# Patient Record
Sex: MALE | Race: WHITE | NOT HISPANIC OR LATINO | Employment: FULL TIME | URBAN - METROPOLITAN AREA
[De-identification: names, ages, dates, MRNs, and addresses within clinical notes are randomized per-mention and may not be internally consistent; named-entity substitution may affect disease eponyms.]

---

## 2021-08-06 ENCOUNTER — IMMUNIZATION (OUTPATIENT)
Dept: VACCINE CLINIC | Facility: HOSPITAL | Age: 56
End: 2021-08-06

## 2021-08-06 PROCEDURE — 0001A: CPT | Performed by: INTERNAL MEDICINE

## 2021-08-06 PROCEDURE — 91300 HC SARSCOV02 VAC 30MCG/0.3ML IM: CPT | Performed by: INTERNAL MEDICINE

## 2021-08-16 ENCOUNTER — APPOINTMENT (OUTPATIENT)
Dept: GENERAL RADIOLOGY | Facility: HOSPITAL | Age: 56
End: 2021-08-16

## 2021-08-16 ENCOUNTER — HOSPITAL ENCOUNTER (INPATIENT)
Facility: HOSPITAL | Age: 56
LOS: 3 days | Discharge: HOME | End: 2021-08-19
Attending: EMERGENCY MEDICINE | Admitting: HOSPITALIST

## 2021-08-16 ENCOUNTER — APPOINTMENT (OUTPATIENT)
Dept: CT IMAGING | Facility: HOSPITAL | Age: 56
End: 2021-08-16

## 2021-08-16 DIAGNOSIS — E87.1 HYPONATREMIA: ICD-10-CM

## 2021-08-16 DIAGNOSIS — U07.1 PNEUMONIA DUE TO COVID-19 VIRUS: Primary | ICD-10-CM

## 2021-08-16 DIAGNOSIS — J12.82 PNEUMONIA DUE TO COVID-19 VIRUS: Primary | ICD-10-CM

## 2021-08-16 LAB
ALBUMIN SERPL-MCNC: 3.8 G/DL (ref 3.5–5.2)
ALBUMIN/GLOB SERPL: 2 G/DL
ALP SERPL-CCNC: 99 U/L (ref 39–117)
ALT SERPL W P-5'-P-CCNC: 143 U/L (ref 1–41)
ANION GAP SERPL CALCULATED.3IONS-SCNC: 10.2 MMOL/L (ref 5–15)
AST SERPL-CCNC: 203 U/L (ref 1–40)
B PARAPERT DNA SPEC QL NAA+PROBE: NOT DETECTED
B PERT DNA SPEC QL NAA+PROBE: NOT DETECTED
BACTERIA UR QL AUTO: ABNORMAL /HPF
BILIRUB SERPL-MCNC: 0.5 MG/DL (ref 0–1.2)
BILIRUB UR QL STRIP: NEGATIVE
BUN SERPL-MCNC: 7 MG/DL (ref 6–20)
BUN/CREAT SERPL: 8 (ref 7–25)
C PNEUM DNA NPH QL NAA+NON-PROBE: NOT DETECTED
CALCIUM SPEC-SCNC: 8.1 MG/DL (ref 8.6–10.5)
CHLORIDE SERPL-SCNC: 92 MMOL/L (ref 98–107)
CLARITY UR: CLEAR
CO2 SERPL-SCNC: 22.8 MMOL/L (ref 22–29)
COLOR UR: ABNORMAL
CREAT SERPL-MCNC: 0.87 MG/DL (ref 0.76–1.27)
CRP SERPL-MCNC: 7.08 MG/DL (ref 0–0.5)
D DIMER PPP FEU-MCNC: 0.64 MCGFEU/ML (ref 0–0.49)
D-LACTATE SERPL-SCNC: 1 MMOL/L (ref 0.5–2)
DEPRECATED RDW RBC AUTO: 39.7 FL (ref 37–54)
ERYTHROCYTE [DISTWIDTH] IN BLOOD BY AUTOMATED COUNT: 12.6 % (ref 12.3–15.4)
FLUAV SUBTYP SPEC NAA+PROBE: NOT DETECTED
FLUBV RNA ISLT QL NAA+PROBE: NOT DETECTED
GFR SERPL CREATININE-BSD FRML MDRD: 90 ML/MIN/1.73
GLOBULIN UR ELPH-MCNC: 1.9 GM/DL
GLUCOSE SERPL-MCNC: 97 MG/DL (ref 65–99)
GLUCOSE UR STRIP-MCNC: NEGATIVE MG/DL
HADV DNA SPEC NAA+PROBE: NOT DETECTED
HAV IGM SERPL QL IA: NORMAL
HBV CORE IGM SERPL QL IA: NORMAL
HBV SURFACE AG SERPL QL IA: NORMAL
HCOV 229E RNA SPEC QL NAA+PROBE: NOT DETECTED
HCOV HKU1 RNA SPEC QL NAA+PROBE: NOT DETECTED
HCOV NL63 RNA SPEC QL NAA+PROBE: NOT DETECTED
HCOV OC43 RNA SPEC QL NAA+PROBE: NOT DETECTED
HCT VFR BLD AUTO: 39.3 % (ref 37.5–51)
HCV AB SER DONR QL: NORMAL
HGB BLD-MCNC: 13.7 G/DL (ref 13–17.7)
HGB UR QL STRIP.AUTO: NEGATIVE
HMPV RNA NPH QL NAA+NON-PROBE: NOT DETECTED
HPIV1 RNA SPEC QL NAA+PROBE: NOT DETECTED
HPIV2 RNA SPEC QL NAA+PROBE: NOT DETECTED
HPIV3 RNA NPH QL NAA+PROBE: NOT DETECTED
HPIV4 P GENE NPH QL NAA+PROBE: NOT DETECTED
HYALINE CASTS UR QL AUTO: ABNORMAL /LPF
INR PPP: 0.94 (ref 0.9–1.1)
KETONES UR QL STRIP: ABNORMAL
L PNEUMO1 AG UR QL IA: NEGATIVE
LEUKOCYTE ESTERASE UR QL STRIP.AUTO: NEGATIVE
LYMPHOCYTES # BLD MANUAL: 0.44 10*3/MM3 (ref 0.7–3.1)
LYMPHOCYTES NFR BLD MANUAL: 10.1 % (ref 19.6–45.3)
LYMPHOCYTES NFR BLD MANUAL: 7.1 % (ref 5–12)
M PNEUMO IGG SER IA-ACNC: NOT DETECTED
MAGNESIUM SERPL-MCNC: 2.1 MG/DL (ref 1.6–2.6)
MCH RBC QN AUTO: 30.2 PG (ref 26.6–33)
MCHC RBC AUTO-ENTMCNC: 34.9 G/DL (ref 31.5–35.7)
MCV RBC AUTO: 86.6 FL (ref 79–97)
MONOCYTES # BLD AUTO: 0.31 10*3/MM3 (ref 0.1–0.9)
NEUTROPHILS # BLD AUTO: 3.63 10*3/MM3 (ref 1.7–7)
NEUTROPHILS NFR BLD MANUAL: 82.8 % (ref 42.7–76)
NITRITE UR QL STRIP: NEGATIVE
NT-PROBNP SERPL-MCNC: 210.2 PG/ML (ref 0–900)
PH UR STRIP.AUTO: 6.5 [PH] (ref 5–8)
PLAT MORPH BLD: NORMAL
PLATELET # BLD AUTO: 178 10*3/MM3 (ref 140–450)
PMV BLD AUTO: 10.5 FL (ref 6–12)
POTASSIUM SERPL-SCNC: 3.6 MMOL/L (ref 3.5–5.2)
PROCALCITONIN SERPL-MCNC: 0.42 NG/ML (ref 0–0.25)
PROT SERPL-MCNC: 5.7 G/DL (ref 6–8.5)
PROT UR QL STRIP: ABNORMAL
PROTHROMBIN TIME: 12.4 SECONDS (ref 11.7–14.2)
RBC # BLD AUTO: 4.54 10*6/MM3 (ref 4.14–5.8)
RBC # UR: ABNORMAL /HPF
RBC MORPH BLD: NORMAL
REF LAB TEST METHOD: ABNORMAL
RHINOVIRUS RNA SPEC NAA+PROBE: NOT DETECTED
RSV RNA NPH QL NAA+NON-PROBE: NOT DETECTED
S PNEUM AG SPEC QL LA: NEGATIVE
SARS-COV-2 RNA NPH QL NAA+NON-PROBE: DETECTED
SMUDGE CELLS BLD QL SMEAR: ABNORMAL
SODIUM SERPL-SCNC: 125 MMOL/L (ref 136–145)
SP GR UR STRIP: 1.02 (ref 1–1.03)
SQUAMOUS #/AREA URNS HPF: ABNORMAL /HPF
TROPONIN T SERPL-MCNC: <0.01 NG/ML (ref 0–0.03)
UROBILINOGEN UR QL STRIP: ABNORMAL
WBC # BLD AUTO: 4.38 10*3/MM3 (ref 3.4–10.8)
WBC UR QL AUTO: ABNORMAL /HPF

## 2021-08-16 PROCEDURE — 36415 COLL VENOUS BLD VENIPUNCTURE: CPT

## 2021-08-16 PROCEDURE — 25010000003 CEFTRIAXONE PER 250 MG: Performed by: EMERGENCY MEDICINE

## 2021-08-16 PROCEDURE — 80074 ACUTE HEPATITIS PANEL: CPT | Performed by: HOSPITALIST

## 2021-08-16 PROCEDURE — 85610 PROTHROMBIN TIME: CPT | Performed by: EMERGENCY MEDICINE

## 2021-08-16 PROCEDURE — 80053 COMPREHEN METABOLIC PANEL: CPT | Performed by: EMERGENCY MEDICINE

## 2021-08-16 PROCEDURE — 99284 EMERGENCY DEPT VISIT MOD MDM: CPT

## 2021-08-16 PROCEDURE — 83605 ASSAY OF LACTIC ACID: CPT | Performed by: EMERGENCY MEDICINE

## 2021-08-16 PROCEDURE — 86140 C-REACTIVE PROTEIN: CPT | Performed by: EMERGENCY MEDICINE

## 2021-08-16 PROCEDURE — 85025 COMPLETE CBC W/AUTO DIFF WBC: CPT | Performed by: EMERGENCY MEDICINE

## 2021-08-16 PROCEDURE — 83880 ASSAY OF NATRIURETIC PEPTIDE: CPT | Performed by: EMERGENCY MEDICINE

## 2021-08-16 PROCEDURE — 71045 X-RAY EXAM CHEST 1 VIEW: CPT

## 2021-08-16 PROCEDURE — 71275 CT ANGIOGRAPHY CHEST: CPT

## 2021-08-16 PROCEDURE — 83735 ASSAY OF MAGNESIUM: CPT | Performed by: EMERGENCY MEDICINE

## 2021-08-16 PROCEDURE — 87899 AGENT NOS ASSAY W/OPTIC: CPT | Performed by: INTERNAL MEDICINE

## 2021-08-16 PROCEDURE — 25010000002 AZITHROMYCIN PER 500 MG: Performed by: HOSPITALIST

## 2021-08-16 PROCEDURE — 0202U NFCT DS 22 TRGT SARS-COV-2: CPT | Performed by: EMERGENCY MEDICINE

## 2021-08-16 PROCEDURE — 25010000002 SODIUM CHLORIDE 0.9 % WITH KCL 20 MEQ 20-0.9 MEQ/L-% SOLUTION: Performed by: HOSPITALIST

## 2021-08-16 PROCEDURE — 0 IOPAMIDOL PER 1 ML: Performed by: EMERGENCY MEDICINE

## 2021-08-16 PROCEDURE — 87040 BLOOD CULTURE FOR BACTERIA: CPT | Performed by: EMERGENCY MEDICINE

## 2021-08-16 PROCEDURE — 84145 PROCALCITONIN (PCT): CPT | Performed by: EMERGENCY MEDICINE

## 2021-08-16 PROCEDURE — 84484 ASSAY OF TROPONIN QUANT: CPT | Performed by: EMERGENCY MEDICINE

## 2021-08-16 PROCEDURE — 85007 BL SMEAR W/DIFF WBC COUNT: CPT | Performed by: EMERGENCY MEDICINE

## 2021-08-16 PROCEDURE — 81001 URINALYSIS AUTO W/SCOPE: CPT | Performed by: EMERGENCY MEDICINE

## 2021-08-16 PROCEDURE — 25010000002 ENOXAPARIN PER 10 MG: Performed by: HOSPITALIST

## 2021-08-16 PROCEDURE — 85379 FIBRIN DEGRADATION QUANT: CPT | Performed by: EMERGENCY MEDICINE

## 2021-08-16 PROCEDURE — 36415 COLL VENOUS BLD VENIPUNCTURE: CPT | Performed by: HOSPITALIST

## 2021-08-16 RX ORDER — ACETAMINOPHEN 325 MG/1
650 TABLET ORAL EVERY 6 HOURS PRN
Status: DISCONTINUED | OUTPATIENT
Start: 2021-08-16 | End: 2021-08-19 | Stop reason: HOSPADM

## 2021-08-16 RX ORDER — MELATONIN
1000 DAILY
Status: DISCONTINUED | OUTPATIENT
Start: 2021-08-16 | End: 2021-08-19 | Stop reason: HOSPADM

## 2021-08-16 RX ORDER — SODIUM CHLORIDE 9 MG/ML
125 INJECTION, SOLUTION INTRAVENOUS CONTINUOUS
Status: DISCONTINUED | OUTPATIENT
Start: 2021-08-16 | End: 2021-08-16

## 2021-08-16 RX ORDER — DEXAMETHASONE SODIUM PHOSPHATE 10 MG/ML
6 INJECTION, SOLUTION INTRAMUSCULAR; INTRAVENOUS DAILY
Status: DISCONTINUED | OUTPATIENT
Start: 2021-08-17 | End: 2021-08-19

## 2021-08-16 RX ORDER — SODIUM CHLORIDE AND POTASSIUM CHLORIDE 150; 900 MG/100ML; MG/100ML
75 INJECTION, SOLUTION INTRAVENOUS CONTINUOUS
Status: DISCONTINUED | OUTPATIENT
Start: 2021-08-16 | End: 2021-08-18

## 2021-08-16 RX ORDER — NITROGLYCERIN 0.4 MG/1
0.4 TABLET SUBLINGUAL
Status: DISCONTINUED | OUTPATIENT
Start: 2021-08-16 | End: 2021-08-19 | Stop reason: HOSPADM

## 2021-08-16 RX ORDER — SODIUM CHLORIDE 0.9 % (FLUSH) 0.9 %
10 SYRINGE (ML) INJECTION AS NEEDED
Status: DISCONTINUED | OUTPATIENT
Start: 2021-08-16 | End: 2021-08-19 | Stop reason: HOSPADM

## 2021-08-16 RX ORDER — FAMOTIDINE 20 MG/1
20 TABLET, FILM COATED ORAL 2 TIMES DAILY
Status: DISCONTINUED | OUTPATIENT
Start: 2021-08-16 | End: 2021-08-19 | Stop reason: HOSPADM

## 2021-08-16 RX ORDER — BUDESONIDE AND FORMOTEROL FUMARATE DIHYDRATE 160; 4.5 UG/1; UG/1
2 AEROSOL RESPIRATORY (INHALATION)
Status: DISCONTINUED | OUTPATIENT
Start: 2021-08-16 | End: 2021-08-19 | Stop reason: HOSPADM

## 2021-08-16 RX ORDER — CETIRIZINE HYDROCHLORIDE 10 MG/1
10 TABLET ORAL DAILY
Status: DISCONTINUED | OUTPATIENT
Start: 2021-08-16 | End: 2021-08-19

## 2021-08-16 RX ORDER — CEFTRIAXONE SODIUM 2 G/50ML
2 INJECTION, SOLUTION INTRAVENOUS ONCE
Status: COMPLETED | OUTPATIENT
Start: 2021-08-16 | End: 2021-08-16

## 2021-08-16 RX ORDER — ZINC SULFATE 50(220)MG
220 CAPSULE ORAL DAILY
Status: DISCONTINUED | OUTPATIENT
Start: 2021-08-16 | End: 2021-08-19

## 2021-08-16 RX ORDER — GUAIFENESIN 600 MG/1
600 TABLET, EXTENDED RELEASE ORAL EVERY 12 HOURS SCHEDULED
Status: DISCONTINUED | OUTPATIENT
Start: 2021-08-16 | End: 2021-08-19 | Stop reason: HOSPADM

## 2021-08-16 RX ORDER — ALBUTEROL SULFATE 2.5 MG/3ML
2.5 SOLUTION RESPIRATORY (INHALATION) EVERY 6 HOURS PRN
Status: DISCONTINUED | OUTPATIENT
Start: 2021-08-16 | End: 2021-08-19 | Stop reason: HOSPADM

## 2021-08-16 RX ORDER — ASCORBIC ACID 500 MG
500 TABLET ORAL DAILY
Status: DISCONTINUED | OUTPATIENT
Start: 2021-08-16 | End: 2021-08-19

## 2021-08-16 RX ORDER — CEFTRIAXONE SODIUM 2 G/50ML
2 INJECTION, SOLUTION INTRAVENOUS EVERY 24 HOURS
Status: DISCONTINUED | OUTPATIENT
Start: 2021-08-16 | End: 2021-08-16

## 2021-08-16 RX ORDER — CEFTRIAXONE SODIUM 2 G/50ML
2 INJECTION, SOLUTION INTRAVENOUS EVERY 24 HOURS
Status: DISCONTINUED | OUTPATIENT
Start: 2021-08-17 | End: 2021-08-19

## 2021-08-16 RX ADMIN — CEFTRIAXONE SODIUM 2 G: 2 INJECTION, SOLUTION INTRAVENOUS at 15:31

## 2021-08-16 RX ADMIN — POTASSIUM CHLORIDE AND SODIUM CHLORIDE 75 ML/HR: 900; 150 INJECTION, SOLUTION INTRAVENOUS at 20:07

## 2021-08-16 RX ADMIN — FAMOTIDINE 20 MG: 20 TABLET, FILM COATED ORAL at 20:06

## 2021-08-16 RX ADMIN — IOPAMIDOL 85 ML: 755 INJECTION, SOLUTION INTRAVENOUS at 16:43

## 2021-08-16 RX ADMIN — OXYCODONE HYDROCHLORIDE AND ACETAMINOPHEN 500 MG: 500 TABLET ORAL at 20:07

## 2021-08-16 RX ADMIN — Medication 220 MG: at 20:07

## 2021-08-16 RX ADMIN — SODIUM CHLORIDE 125 ML/HR: 9 INJECTION, SOLUTION INTRAVENOUS at 14:17

## 2021-08-16 RX ADMIN — GUAIFENESIN 600 MG: 600 TABLET, EXTENDED RELEASE ORAL at 20:07

## 2021-08-16 RX ADMIN — SODIUM CHLORIDE 1000 ML: 9 INJECTION, SOLUTION INTRAVENOUS at 13:42

## 2021-08-16 RX ADMIN — CETIRIZINE HYDROCHLORIDE 10 MG: 10 TABLET ORAL at 20:06

## 2021-08-16 RX ADMIN — AZITHROMYCIN 500 MG: 500 INJECTION, POWDER, LYOPHILIZED, FOR SOLUTION INTRAVENOUS at 20:08

## 2021-08-16 RX ADMIN — ENOXAPARIN SODIUM 40 MG: 40 INJECTION SUBCUTANEOUS at 20:07

## 2021-08-16 RX ADMIN — Medication 1000 UNITS: at 20:06

## 2021-08-16 RX ADMIN — ACETAMINOPHEN 650 MG: 325 TABLET, FILM COATED ORAL at 18:26

## 2021-08-16 NOTE — H&P
"History and physical     Primary care physician  Dr. Sanchez    Chief complaint  Shortness of breath  Nonproductive cough  Generalized weakness    History of present illness  56-year-old white male with no medical problem home medication who is an ex-smoker and drinks socially who has received 1 dose of COVID-19 vaccine earlier this month presented to [facility] emergency room with shortness of breath nonproductive cough congestion fatigue tired and developed fever today.  Patient denies any loss of taste and smell but has no appetite.  Patient has been tested positive for Covid 6 days ago.  Patient work-up in ER revealed COVID-19 pneumonia and possible bacterial pneumonia admit for management.  Patient also found to have hyponatremia and elevated liver enzymes with severe dehydration     PAST MEDICAL HISTORY   Unremarkable      PAST SURGICAL HISTORY     History reviewed. No pertinent surgical history.     FAMILY HISTORY  History reviewed. No pertinent family history.     SOCIAL HISTORY                 Socioeconomic History   • Marital status: Single       Spouse name: Not on file   • Number of children: Not on file   • Years of education: Not on file   • Highest education level: Not on file   Tobacco Use   • Smoking status: Former Smoker       Packs/day: 1.00       Types: Cigarettes       Quit date: 2007       Years since quittin.5   • Smokeless tobacco: Never Used   • Tobacco comment: \"I quit 14 years ago.\"   Substance and Sexual Activity   • Alcohol use: Yes       Comment: \"once a month probably\"   • Drug use: Never         ALLERGIES  Patient has no known allergies.  No home medications     REVIEW OF SYSTEMS  All systems reviewed and negative except for those discussed in HPI.      PHYSICAL EXAM   Blood pressure 124/75, pulse 87, temperature 97.1 °F (36.2 °C), resp. rate 18, height 175.3 cm (69\"), weight 97.5 kg (215 lb), SpO2 95 %.    GENERAL: Male that appears weak and tired.  No acute " "distress.  HENT: nares patent  Head/neck/ face are symmetric without gross deformity, signs of trauma, or swelling  EYES: no scleral icterus, no conjunctival pallor.  NECK: Supple, no meningismus  CV: regular rhythm, regular rate with intact distal pulses.  No murmur or rub  RESPIRATORY: normal effort and no respiratory distress.  Rhonchi in the base of his lungs bilaterally on auscultation.  ABDOMEN: soft and nontender.  Obese  MUSCULOSKELETAL: no deformity.  No edema.  Intact distal pulses to upper and lower extremities that are equal strong and symmetric.  NEURO: alert and appropriate, moves all extremities, follows commands.  No focal motor or sensory changes.  SKIN: warm, dry     LAB RESULTS  Lab Results (last 24 hours)     Procedure Component Value Units Date/Time    Blood Culture - Blood, Arm, Left [[id number]] Collected: 08/16/21 1530    Specimen: Blood from Arm, Left Updated: 08/16/21 1542    Procalcitonin [[id number]]  (Abnormal) Collected: 08/16/21 1340    Specimen: Blood Updated: 08/16/21 1501     Procalcitonin 0.42 ng/mL     Narrative:      As a Marker for Sepsis (Non-Neonates):     1. <0.5 ng/mL represents a low risk of severe sepsis and/or septic shock.  2. >2 ng/mL represents a high risk of severe sepsis and/or septic shock.    As a Marker for Lower Respiratory Tract Infections that require antibiotic therapy:  PCT on Admission     Antibiotic Therapy             6-12 Hrs later  >0.5                          Strongly Recommended            >0.25 - <0.5             Recommended  0.1 - 0.25                  Discouraged                       Remeasure/reassess PCT  <0.1                         Strongly Discouraged         Remeasure/reassess PCT      As 28 day mortality risk marker: \"Change in Procalcitonin Result\" (>80% or <=80%) if Day 0 (or Day 1) and Day 4 values are available. Refer to http://www.[employer]-pct-calculator.com/    Change in PCT <=80 %   A decrease of PCT levels below or equal to 80% defines " a positive change in PCT test result representing a higher risk for 28-day all-cause mortality of patients diagnosed with severe sepsis or septic shock.    Change in PCT >80 %   A decrease of PCT levels of more than 80% defines a negative change in PCT result representing a lower risk for 28-day all-cause mortality of patients diagnosed with severe sepsis or septic shock.              Results may be falsely decreased if patient taking Biotin.     Troponin [[id number]]  (Normal) Collected: 08/16/21 1340    Specimen: Blood Updated: 08/16/21 1501     Troponin T <0.010 ng/mL     Narrative:      Troponin T Reference Range:  <= 0.03 ng/mL-   Negative for AMI  >0.03 ng/mL-     Abnormal for myocardial necrosis.  Clinicians would have to utilize clinical acumen, EKG, Troponin and serial changes to determine if it is an Acute Myocardial Infarction or myocardial injury due to an underlying chronic condition.       Results may be falsely decreased if patient taking Biotin.      BNP [[id number]]  (Normal) Collected: 08/16/21 1340    Specimen: Blood Updated: 08/16/21 1501     proBNP 210.2 pg/mL     Narrative:      Among patients with dyspnea, NT-proBNP is highly sensitive for the detection of acute congestive heart failure. In addition NT-proBNP of <300 pg/ml effectively rules out acute congestive heart failure with 99% negative predictive value.    Results may be falsely decreased if patient taking Biotin.      Manual Differential [[id number]]  (Abnormal) Collected: 08/16/21 1340    Specimen: Blood Updated: 08/16/21 1501     Neutrophil % 82.8 %      Lymphocyte % 10.1 %      Monocyte % 7.1 %      Neutrophils Absolute 3.63 10*3/mm3      Lymphocytes Absolute 0.44 10*3/mm3      Monocytes Absolute 0.31 10*3/mm3      RBC Morphology Normal     Smudge Cells Slight/1+     Platelet Morphology Normal    CBC & Differential [[id number]]  (Normal) Collected: 08/16/21 1340    Specimen: Blood Updated: 08/16/21 1500    Narrative:      The following  orders were created for panel order CBC & Differential.  Procedure                               Abnormality         Status                     ---------                               -----------         ------                     CBC Auto Differential[[id number]]        Normal              Final result                 Please view results for these tests on the individual orders.    CBC Auto Differential [[id number]]  (Normal) Collected: 08/16/21 1340    Specimen: Blood Updated: 08/16/21 1500     WBC 4.38 10*3/mm3      RBC 4.54 10*6/mm3      Hemoglobin 13.7 g/dL      Hematocrit 39.3 %      MCV 86.6 fL      MCH 30.2 pg      MCHC 34.9 g/dL      RDW 12.6 %      RDW-SD 39.7 fl      MPV 10.5 fL      Platelets 178 10*3/mm3     Comprehensive Metabolic Panel [[id number]]  (Abnormal) Collected: 08/16/21 1340    Specimen: Blood Updated: 08/16/21 1456     Glucose 97 mg/dL      BUN 7 mg/dL      Creatinine 0.87 mg/dL      Sodium 125 mmol/L      Potassium 3.6 mmol/L      Chloride 92 mmol/L      CO2 22.8 mmol/L      Calcium 8.1 mg/dL      Total Protein 5.7 g/dL      Albumin 3.80 g/dL      ALT (SGPT) 143 U/L      AST (SGOT) 203 U/L      Alkaline Phosphatase 99 U/L      Total Bilirubin 0.5 mg/dL      eGFR Non African Amer 90 mL/min/1.73      Globulin 1.9 gm/dL      A/G Ratio 2.0 g/dL      BUN/Creatinine Ratio 8.0     Anion Gap 10.2 mmol/L     Narrative:      GFR Normal >60  Chronic Kidney Disease <60  Kidney Failure <15      C-reactive Protein [[id number]]  (Abnormal) Collected: 08/16/21 1340    Specimen: Blood Updated: 08/16/21 1456     C-Reactive Protein 7.08 mg/dL     Magnesium [[id number]]  (Normal) Collected: 08/16/21 1340    Specimen: Blood Updated: 08/16/21 1456     Magnesium 2.1 mg/dL     Urinalysis, Microscopic Only - Urine, Clean Catch [[id number]]  (Abnormal) Collected: 08/16/21 1437    Specimen: Urine, Clean Catch Updated: 08/16/21 1456     RBC, UA 0-2 /HPF      WBC, UA 3-5 /HPF      Bacteria, UA None Seen /HPF      Squamous  Epithelial Cells, UA 0-2 /HPF      Hyaline Casts, UA 0-2 /LPF      Methodology Automated Microscopy    Urinalysis With Microscopic If Indicated (No Culture) - Urine, Clean Catch [[id number]]  (Abnormal) Collected: 08/16/21 1437    Specimen: Urine, Clean Catch Updated: 08/16/21 1455     Color, UA Dark Yellow     Appearance, UA Clear     pH, UA 6.5     Specific Gravity, UA 1.021     Glucose, UA Negative     Ketones, UA Trace     Bilirubin, UA Negative     Blood, UA Negative     Protein, UA 30 mg/dL (1+)     Leuk Esterase, UA Negative     Nitrite, UA Negative     Urobilinogen, UA 2.0 E.U./dL    Lactic Acid, Plasma [[id number]]  (Normal) Collected: 08/16/21 1411    Specimen: Blood Updated: 08/16/21 1448     Lactate 1.0 mmol/L     Protime-INR [[id number]]  (Normal) Collected: 08/16/21 1340    Specimen: Blood Updated: 08/16/21 1442     Protime 12.4 Seconds      INR 0.94    D-dimer, Quantitative [[id number]]  (Abnormal) Collected: 08/16/21 1340    Specimen: Blood Updated: 08/16/21 1442     D-Dimer, Quantitative 0.64 MCGFEU/mL     Narrative:      The Stago D-Dimer test used in conjunction with a clinical pretest probability (PTP) assessment model, has been approved by the FDA to rule out the presence of venous thromboembolism (VTE) in outpatients suspected of deep venous thrombosis (DVT) or pulmonary embolism (PE). The cut-off for negative predictive value is <0.50 MCGFEU/mL.    Blood Culture - Blood, Arm, Right [[id number]] Collected: 08/16/21 1343    Specimen: Blood from Arm, Right Updated: 08/16/21 1425    Blood Culture - Blood, Arm, Left [[id number]] Collected: 08/16/21 1411    Specimen: Blood from Arm, Left Updated: 08/16/21 1425        Imaging Results (Last 24 Hours)     Procedure Component Value Units Date/Time    XR Chest 1 View [[id number]] Collected: 08/16/21 1349     Updated: 08/16/21 1501    Narrative:      ONE VIEW PORTABLE CHEST     HISTORY: Positive COVID-19 infection. Weakness.     There is some faint consolidation  at the periphery of both lungs highly  suspicious for changes of COVID-19 pneumonia and continued follow-up  evaluation may be helpful. The heart is borderline enlarged.     This report was finalized on 8/16/2021 2:58 PM by Dr. Sumit Raza M.D.             Current Facility-Administered Medications:   •  acetaminophen (TYLENOL) tablet 650 mg, 650 mg, Oral, Q6H PRN, Jessee Alvarez MD  •  albuterol (PROVENTIL) nebulizer solution 0.083% 2.5 mg/3mL, 2.5 mg, Nebulization, Q6H PRN, Jessee Alvarez MD  •  ascorbic acid (VITAMIN C) tablet 500 mg, 500 mg, Oral, Daily, Jessee Alvarez MD  •  AZITHROMYCIN 500 MG/250 ML 0.9% NS IVPB (vial-mate), 500 mg, Intravenous, Q24H, Jessee Alvarez MD  •  budesonide-formoterol (SYMBICORT) 160-4.5 MCG/ACT inhaler 2 puff, 2 puff, Inhalation, BID - RT, Jessee Alvarez MD  •  cefTRIAXone (ROCEPHIN) IVPB 2 g, 2 g, Intravenous, Q24H, Jessee Alvarez MD  •  cetirizine (zyrTEC) tablet 10 mg, 10 mg, Oral, Daily, Jessee Alvarez MD  •  cholecalciferol (VITAMIN D3) tablet 1,000 Units, 1,000 Units, Oral, Daily, Jessee Alvarez MD  •  enoxaparin (LOVENOX) syringe 40 mg, 40 mg, Subcutaneous, Q24H, Jessee Alvarez MD  •  famotidine (PEPCID) tablet 20 mg, 20 mg, Oral, BID, Jessee Alvarez MD  •  guaiFENesin (MUCINEX) 12 hr tablet 600 mg, 600 mg, Oral, Q12H, Jessee Alvarez MD  •  [COMPLETED] Insert peripheral IV, , , Once **AND** sodium chloride 0.9 % flush 10 mL, 10 mL, Intravenous, PRN, Efra Prieto MD  •  sodium chloride 0.9 % with KCl 20 mEq/L infusion, 75 mL/hr, Intravenous, Continuous, Jessee Alvarez MD  •  zinc sulfate (ZINCATE) capsule 220 mg, 220 mg, Oral, Daily, Jessee Alvarez MD  No current outpatient medications on file.     ASSESSMENT  COVID-19 pneumonia with superadded bacterial pneumonia  Hyponatremia  Severe dehydration  Elevated liver LFTs  Ex-smoker  Gastroesophageal reflux disease    PLAN  Admit  IVF  Empiric antibiotics  Supplemental oxygen as needed  Symbicort twice daily and albuterol as  needed  Symptomatic treatment for cough congestion and fever  No indication for Decadron or remdesivir  Supportive care  Patient is full code  Discussed with nursing staff  Follow closely further recommendation current hospital course    SUE MAS MD

## 2021-08-16 NOTE — PLAN OF CARE
Goal Outcome Evaluation:  Plan of Care Reviewed With: patient        Progress: no change  Outcome Summary: VSS. Admit from ER. Temp elevated- tylnenol given. Safety maintained. Will continue to monitor.

## 2021-08-16 NOTE — ED NOTES
Pt reports testing positive for COVID on 8/11 but having symptoms since 8/9. Now states this week has been having generalized weakness and muscle aches. Had first dose of covid vaccine on 8/7. Pt a&ox4, abc's intact, NAD noted, ambulatory to triage.     Patient wearing mask during triage. RN wearing appropriate PPE during triage. Hand hygiene performed.        Steph Herbert, RN  08/16/21 0782    
This RN wearing all appropriate PPE during patient encounter. Hand hygiene performed before and during entering room.       Radha Linton, JAROD  08/16/21 2735    
This RN wearing all appropriate PPE during patient encounter. Hand hygiene performed before and during entering room.       Radha Linton, JAROD  08/16/21 7123    
This RN wearing all appropriate PPE during patient encounter. Hand hygiene performed before and during entering room.       Radha Linton, JAROD  08/16/21 7440    
This RN wearing all appropriate PPE during patient encounter. Hand hygiene performed before and during entering room.       Radha Linton, JAROD  08/16/21 9808    
yes

## 2021-08-16 NOTE — ED PROVIDER NOTES
EMERGENCY DEPARTMENT ENCOUNTER    Room Number:  N440/1  Date of encounter:  8/18/2021  PCP: Edgar Sanchez MD  Historian: Patient      HPI:  Chief Complaint: I have COVID and I am feeling worse  A complete HPI/ROS/PMH/PSH/SH/FH are unobtainable due to: Not applicable  Context: Jeronimo Bain is a 56 y.o. male who presents to the ED c/o patient symptoms for Covid started about 8 days ago.  Started with a little bit of nasal congestion soon after developed fever and fatigue.  His last episode of fever was this morning.  It improves and resolves with Tylenol.  He has had a mild dry cough.  At rest he does not have shortness of breath but reports some shortness of breath with exertion.  He denies any chest pain or abdominal pain.  He has has decreased appetite and is not eating or drinking as much.  Denies any vomiting or diarrhea.  His most significant symptoms are the fever and just the profound weakness and no energy level.  He will get occasional headaches but does not have a headache at this time.  He tested positive for Covid 6 days ago.  He was vaccinated with his first dose 1 day before his symptoms started.  His mother recently passed away from Covid and also another ill family member with Covid as well.  He denies any history of lung problems or heart problems.        Previous Episodes: Note  Current Symptoms: See above    MEDICAL HISTORY REVIEWED        PAST MEDICAL HISTORY  Active Ambulatory Problems     Diagnosis Date Noted   • No Active Ambulatory Problems     Resolved Ambulatory Problems     Diagnosis Date Noted   • No Resolved Ambulatory Problems     No Additional Past Medical History         PAST SURGICAL HISTORY  History reviewed. No pertinent surgical history.      FAMILY HISTORY  History reviewed. No pertinent family history.      SOCIAL HISTORY  Social History     Socioeconomic History   • Marital status: Single     Spouse name: Not on file   • Number of children: Not on file   • Years of  "education: Not on file   • Highest education level: Not on file   Tobacco Use   • Smoking status: Former Smoker     Packs/day: 1.00     Types: Cigarettes     Quit date: 2007     Years since quittin.5   • Smokeless tobacco: Never Used   • Tobacco comment: \"I quit 14 years ago.\"   Substance and Sexual Activity   • Alcohol use: Yes     Comment: \"once a month probably\"   • Drug use: Never         ALLERGIES  Patient has no known allergies.        REVIEW OF SYSTEMS  Review of Systems     All systems reviewed and negative except for those discussed in HPI.       PHYSICAL EXAM    I have reviewed the triage vital signs and nursing notes.    ED Triage Vitals   Temp Heart Rate Resp BP SpO2   21 1050 21 1050 21 1050 21 1207 21 1050   97.1 °F (36.2 °C) 97 18 106/74 95 %      Temp src Heart Rate Source Patient Position BP Location FiO2 (%)   -- -- 21 1207 21 1207 --     Lying Left arm        GENERAL: Male that appears weak and tired.  No acute distress.Vital signs on my initial evaluation O2 sat on room air on my exam is 93 to 94% with a good waveform.  Blood pressure and heart rate is normal.  HENT: nares patent  Head/neck/ face are symmetric without gross deformity, signs of trauma, or swelling  EYES: no scleral icterus, no conjunctival pallor.  NECK: Supple, no meningismus  CV: regular rhythm, regular rate with intact distal pulses.  No murmur or rub  RESPIRATORY: normal effort and no respiratory distress.  Rhonchi in the base of his lungs bilaterally on auscultation.  ABDOMEN: soft and nontender.  Obese  MUSCULOSKELETAL: no deformity.  No edema.  Intact distal pulses to upper and lower extremities that are equal strong and symmetric.  NEURO: alert and appropriate, moves all extremities, follows commands.  No focal motor or sensory changes.  SKIN: warm, dry    Vital signs and nursing notes reviewed.        LAB RESULTS  Recent Results (from the past 24 hour(s))   Comprehensive " Metabolic Panel    Collection Time: 08/18/21  3:21 AM    Specimen: Blood   Result Value Ref Range    Glucose 91 65 - 99 mg/dL    BUN 8 6 - 20 mg/dL    Creatinine 0.60 (L) 0.76 - 1.27 mg/dL    Sodium 135 (L) 136 - 145 mmol/L    Potassium 3.9 3.5 - 5.2 mmol/L    Chloride 105 98 - 107 mmol/L    CO2 19.0 (L) 22.0 - 29.0 mmol/L    Calcium 8.0 (L) 8.6 - 10.5 mg/dL    Total Protein 5.5 (L) 6.0 - 8.5 g/dL    Albumin 2.70 (L) 3.50 - 5.20 g/dL    ALT (SGPT) 238 (H) 1 - 41 U/L    AST (SGOT) 243 (H) 1 - 40 U/L    Alkaline Phosphatase 85 39 - 117 U/L    Total Bilirubin 0.3 0.0 - 1.2 mg/dL    eGFR Non African Amer 138 >60 mL/min/1.73    Globulin 2.8 gm/dL    A/G Ratio 1.0 g/dL    BUN/Creatinine Ratio 13.3 7.0 - 25.0    Anion Gap 11.0 5.0 - 15.0 mmol/L   C-reactive Protein    Collection Time: 08/18/21  3:21 AM    Specimen: Blood   Result Value Ref Range    C-Reactive Protein 5.90 (H) 0.00 - 0.50 mg/dL   CBC Auto Differential    Collection Time: 08/18/21  3:21 AM    Specimen: Blood   Result Value Ref Range    WBC 4.03 3.40 - 10.80 10*3/mm3    RBC 4.01 (L) 4.14 - 5.80 10*6/mm3    Hemoglobin 11.9 (L) 13.0 - 17.7 g/dL    Hematocrit 35.1 (L) 37.5 - 51.0 %    MCV 87.5 79.0 - 97.0 fL    MCH 29.7 26.6 - 33.0 pg    MCHC 33.9 31.5 - 35.7 g/dL    RDW 13.1 12.3 - 15.4 %    RDW-SD 41.8 37.0 - 54.0 fl    MPV 10.1 6.0 - 12.0 fL    Platelets 226 140 - 450 10*3/mm3    Neutrophil % 66.4 42.7 - 76.0 %    Lymphocyte % 23.8 19.6 - 45.3 %    Monocyte % 8.4 5.0 - 12.0 %    Eosinophil % 0.0 (L) 0.3 - 6.2 %    Basophil % 0.2 0.0 - 1.5 %    Immature Grans % 1.2 (H) 0.0 - 0.5 %    Neutrophils, Absolute 2.67 1.70 - 7.00 10*3/mm3    Lymphocytes, Absolute 0.96 0.70 - 3.10 10*3/mm3    Monocytes, Absolute 0.34 0.10 - 0.90 10*3/mm3    Eosinophils, Absolute 0.00 0.00 - 0.40 10*3/mm3    Basophils, Absolute 0.01 0.00 - 0.20 10*3/mm3    Immature Grans, Absolute 0.05 0.00 - 0.05 10*3/mm3    nRBC 0.0 0.0 - 0.2 /100 WBC   Bilirubin, Direct    Collection Time: 08/18/21   3:21 AM    Specimen: Blood   Result Value Ref Range    Bilirubin, Direct <0.2 0.0 - 0.3 mg/dL       Ordered the above labs and independently reviewed the results.        RADIOLOGY  US Gallbladder    Result Date: 8/17/2021  Patient: ANTOINETTE CALERO  Time Out: 21:53 Exam(s): US GALLBLADDER EXAM:   US Abdomen Limited, Gallbladder CLINICAL HISTORY:    Reason for exam: AAA, pre-op planning. Elevated LFTs. TECHNIQUE:   Real-time ultrasound of the right upper quadrant with image documentation. COMPARISON:   None FINDINGS:   Liver:  Liver measures 16.4 cm.  No focal lesion.   Gallbladder:  Internal echoes within the gallbladder may be secondary to ring-down artifact (adenomyomatosis) from the gallbladder wall versus stones sludge.  Nonspecific gallbladder wall thickening.  Negative sonographic No's sign.   Common bile duct:  Normal common bile duct measuring 4.7 mm.  No stones.   No dilation.   Pancreas:  Visualized portions of the pancreas are grossly unremarkable.   Right kidney:  Right renal cysts measuring up to 1.9 cm.  Right kidney measures 11.9 cm in length.  No hydronephrosis or stone.   Other vasculature:  Patent main portal vein with normal directional flow.   Free fluid:  No ascites. IMPRESSION:       Internal echoes within the gallbladder may be secondary to ring-down artifact (adenomyomatosis) from the gallbladder wall versus stones sludge.   Nonspecific gallbladder wall thickening.  Negative sonographic No's sign.     Electronically signed by Hossein Ferrera M.D. on 08-17-21 at 2153      I ordered the above noted radiological studies. Reviewed by me and discussed with radiologist.  See dictation for official radiology interpretation.      PROCEDURES    Procedures      MEDICATIONS GIVEN IN ER    Medications   sodium chloride 0.9 % flush 10 mL (has no administration in time range)   enoxaparin (LOVENOX) syringe 40 mg ( Subcutaneous Canceled Entry 8/17/21 1634)   budesonide-formoterol (SYMBICORT) 160-4.5  MCG/ACT inhaler 2 puff (2 puffs Inhalation Given 8/18/21 0742)   famotidine (PEPCID) tablet 20 mg (20 mg Oral Given 8/18/21 0917)   cholecalciferol (VITAMIN D3) tablet 1,000 Units (1,000 Units Oral Given 8/18/21 0916)   ascorbic acid (VITAMIN C) tablet 500 mg (500 mg Oral Given 8/18/21 0916)   zinc sulfate (ZINCATE) capsule 220 mg (220 mg Oral Given 8/18/21 0916)   guaiFENesin (MUCINEX) 12 hr tablet 600 mg (600 mg Oral Given 8/18/21 0917)   albuterol (PROVENTIL) nebulizer solution 0.083% 2.5 mg/3mL (has no administration in time range)   cetirizine (zyrTEC) tablet 10 mg (10 mg Oral Given 8/18/21 0916)   AZITHROMYCIN 500 MG/250 ML 0.9% NS IVPB (vial-mate) (500 mg Intravenous New Bag 8/17/21 1712)   acetaminophen (TYLENOL) tablet 650 mg (650 mg Oral Given 8/16/21 1826)   nitroglycerin (NITROSTAT) SL tablet 0.4 mg (has no administration in time range)   cefTRIAXone (ROCEPHIN) IVPB 2 g (2 g Intravenous New Bag 8/17/21 1513)   dexamethasone sodium phosphate injection 6 mg (6 mg Intravenous Given 8/18/21 0916)   remdesivir 200 mg in sodium chloride 0.9 % 290 mL IVPB (powder vial) (200 mg Intravenous New Bag 8/17/21 0039)     Followed by   remdesivir 100 mg in sodium chloride 0.9 % 270 mL IVPB (powder vial) (100 mg Intravenous New Bag 8/17/21 2016)   Pharmacy Consult - Remdesivir (has no administration in time range)   sodium chloride 0.9 % bolus 1,000 mL (0 mL Intravenous Stopped 8/16/21 1631)   cefTRIAXone (ROCEPHIN) IVPB 2 g (0 g Intravenous Stopped 8/16/21 1631)   iopamidol (ISOVUE-370) 76 % injection 100 mL (85 mL Intravenous Given 8/16/21 1643)         PROGRESS, DATA ANALYSIS, CONSULTS, AND MEDICAL DECISION MAKING    This gentleman informed him of the results of his x-rays in which she has Covid pneumonia.  Informed him that we will admit him to the hospital.  Continue to watch him closely.  Most people with Covid infections get worse during the second week of the infection.  All questions answered at this  time.    We are currently under a pandemic from the COVID19 infection.  The patient presented to the emergency department by ambulance or personal vehicle. I followed the current protocols required by Infection Control at [facility] in my evaluation and treatment of the patient. The patient was wearing a face mask during my evaluation and throughout my encounter. During my whole encounter with this patient I used appropriate personal protective equipment.  This equipment consisted of eye protection, facemask, gown, and gloves.  I applied this equipment before entering the room.      All labs have been independently reviewed by me.  All radiology studies have been reviewed by me and discussed with radiologist dictating the report.   EKG's independently viewed and interpreted by me.  Discussion below represents my analysis of pertinent findings related to patient's condition, differential diagnosis, treatment plan and final disposition.      ED Course as of Aug 18 1403   Mon Aug 16, 2021   1325 I looked at the chest x-ray and also reviewed the radiologist report.  Patient has the appearance of some pneumonic changes at the base of both lungs bilaterally very likely consistent with Covid pneumonia.  Please see complete dictated report from the radiologist.    [MM]   1341 On reevaluation patient's O2 sat is 95% on room air.  Heart rate and blood pressure is normal.  States he is feeling a little bit better after the fluid.  Informed him that we did do a CT of his chest and clarified again to him that we are admitting to the hospital.  All questions answered.    [MM]   1507 Sodium(!): 125 [MM]   1535 I discussed the case with Dr. Alvarez who is on for the hospitalist for Dr. Sanchez.  Informed him of the patient's presenting symptoms and results of test.  He will follow up on the CT angiogram of the chest.  Agrees to admit the patient to the hospital.    [MM]   1600 Patient is remained stable here in the  emergency department.  The CT angiogram of the chest is pending.  Dr. Alvarez will follow up with the results.  I also did inform my partner Dr. Short about the CT of the chest.    [MM]   1705 I discussed the CT scan with Dr. Smith, radiology.  Patient has no evidence of PE.  Patient has both central and peripheral groundglass opacities.    [TD]      ED Course User Index  [MM] Efra Prieto MD  [TD] Justin Short II, MD       AS OF 14:03 EDT VITALS:    BP - 114/92  HR - 87  TEMP - 97.1 °F (36.2 °C) (Oral)  02 SATS - 96%        DIAGNOSIS  Final diagnoses:   Pneumonia due to COVID-19 virus   Hyponatremia         DISPOSITION  I have reviewed the test results with my patient and explained the current treatment plan.  I answered all of the patient's questions.  The patient will be admitted to monitor bed at this time.  The patient is not hypotensive and is tolerating their current disease condition well enough for a monitored bed at this time.  The patient's current condition does not require intensive care treatment at this time.             Efra Prieto MD  08/18/21 [id number]

## 2021-08-16 NOTE — ED TRIAGE NOTES
Pt to ER via PV. Pt states tested positive for COVID over 10 days ago. Pt c/o generalized weakness.     Patient in mask. This RN in appropriate PPE throughout the patient's entire encounter.

## 2021-08-17 ENCOUNTER — APPOINTMENT (OUTPATIENT)
Dept: ULTRASOUND IMAGING | Facility: HOSPITAL | Age: 56
End: 2021-08-17

## 2021-08-17 LAB
ALBUMIN SERPL-MCNC: 3.2 G/DL (ref 3.5–5.2)
ALBUMIN/GLOB SERPL: 1 G/DL
ALP SERPL-CCNC: 104 U/L (ref 39–117)
ALT SERPL W P-5'-P-CCNC: 238 U/L (ref 1–41)
ANION GAP SERPL CALCULATED.3IONS-SCNC: 9.7 MMOL/L (ref 5–15)
ANISOCYTOSIS BLD QL: ABNORMAL
AST SERPL-CCNC: 305 U/L (ref 1–40)
BILIRUB CONJ SERPL-MCNC: 0.2 MG/DL (ref 0–0.3)
BILIRUB SERPL-MCNC: 0.3 MG/DL (ref 0–1.2)
BUN SERPL-MCNC: 6 MG/DL (ref 6–20)
BUN/CREAT SERPL: 7.9 (ref 7–25)
CALCIUM SPEC-SCNC: 8.1 MG/DL (ref 8.6–10.5)
CHLORIDE SERPL-SCNC: 100 MMOL/L (ref 98–107)
CHOLEST SERPL-MCNC: 94 MG/DL (ref 0–200)
CO2 SERPL-SCNC: 20.3 MMOL/L (ref 22–29)
CREAT SERPL-MCNC: 0.76 MG/DL (ref 0.76–1.27)
CRP SERPL-MCNC: 8.6 MG/DL (ref 0–0.5)
D DIMER PPP FEU-MCNC: 0.62 MCGFEU/ML (ref 0–0.49)
DEPRECATED RDW RBC AUTO: 40.5 FL (ref 37–54)
ERYTHROCYTE [DISTWIDTH] IN BLOOD BY AUTOMATED COUNT: 12.8 % (ref 12.3–15.4)
FERRITIN SERPL-MCNC: 5773 NG/ML (ref 30–400)
GFR SERPL CREATININE-BSD FRML MDRD: 105 ML/MIN/1.73
GLOBULIN UR ELPH-MCNC: 3.1 GM/DL
GLUCOSE SERPL-MCNC: 96 MG/DL (ref 65–99)
HBA1C MFR BLD: 4.9 % (ref 4.8–5.6)
HCT VFR BLD AUTO: 39.7 % (ref 37.5–51)
HDLC SERPL-MCNC: 29 MG/DL (ref 40–60)
HGB BLD-MCNC: 13.5 G/DL (ref 13–17.7)
LDLC SERPL CALC-MCNC: 39 MG/DL (ref 0–100)
LDLC/HDLC SERPL: 1.18 {RATIO}
LYMPHOCYTES # BLD MANUAL: 0.61 10*3/MM3 (ref 0.7–3.1)
LYMPHOCYTES NFR BLD MANUAL: 12.2 % (ref 19.6–45.3)
LYMPHOCYTES NFR BLD MANUAL: 6.1 % (ref 5–12)
MCH RBC QN AUTO: 29.7 PG (ref 26.6–33)
MCHC RBC AUTO-ENTMCNC: 34 G/DL (ref 31.5–35.7)
MCV RBC AUTO: 87.4 FL (ref 79–97)
MONOCYTES # BLD AUTO: 0.31 10*3/MM3 (ref 0.1–0.9)
NEUTROPHILS # BLD AUTO: 4 10*3/MM3 (ref 1.7–7)
NEUTROPHILS NFR BLD MANUAL: 79.6 % (ref 42.7–76)
OTHER CELLS %: 2 % (ref 0–0)
PLAT MORPH BLD: NORMAL
PLATELET # BLD AUTO: 189 10*3/MM3 (ref 140–450)
PMV BLD AUTO: 10 FL (ref 6–12)
POIKILOCYTOSIS BLD QL SMEAR: ABNORMAL
POLYCHROMASIA BLD QL SMEAR: ABNORMAL
POTASSIUM SERPL-SCNC: 3.6 MMOL/L (ref 3.5–5.2)
PROT SERPL-MCNC: 6.3 G/DL (ref 6–8.5)
RBC # BLD AUTO: 4.54 10*6/MM3 (ref 4.14–5.8)
SODIUM SERPL-SCNC: 130 MMOL/L (ref 136–145)
TRIGL SERPL-MCNC: 154 MG/DL (ref 0–150)
TSH SERPL DL<=0.05 MIU/L-ACNC: 1.41 UIU/ML (ref 0.27–4.2)
VLDLC SERPL-MCNC: 26 MG/DL (ref 5–40)
WBC # BLD AUTO: 5.03 10*3/MM3 (ref 3.4–10.8)
WBC MORPH BLD: NORMAL

## 2021-08-17 PROCEDURE — 80053 COMPREHEN METABOLIC PANEL: CPT | Performed by: HOSPITALIST

## 2021-08-17 PROCEDURE — 82248 BILIRUBIN DIRECT: CPT | Performed by: INTERNAL MEDICINE

## 2021-08-17 PROCEDURE — 94640 AIRWAY INHALATION TREATMENT: CPT

## 2021-08-17 PROCEDURE — 25010000003 CEFTRIAXONE PER 250 MG: Performed by: INTERNAL MEDICINE

## 2021-08-17 PROCEDURE — XW033E5 INTRODUCTION OF REMDESIVIR ANTI-INFECTIVE INTO PERIPHERAL VEIN, PERCUTANEOUS APPROACH, NEW TECHNOLOGY GROUP 5: ICD-10-PCS | Performed by: HOSPITALIST

## 2021-08-17 PROCEDURE — 80061 LIPID PANEL: CPT | Performed by: HOSPITALIST

## 2021-08-17 PROCEDURE — 86140 C-REACTIVE PROTEIN: CPT | Performed by: HOSPITALIST

## 2021-08-17 PROCEDURE — 84443 ASSAY THYROID STIM HORMONE: CPT | Performed by: HOSPITALIST

## 2021-08-17 PROCEDURE — 85379 FIBRIN DEGRADATION QUANT: CPT | Performed by: HOSPITALIST

## 2021-08-17 PROCEDURE — 25010000002 ENOXAPARIN PER 10 MG: Performed by: HOSPITALIST

## 2021-08-17 PROCEDURE — 25010000002 DEXAMETHASONE SODIUM PHOSPHATE 10 MG/ML SOLUTION: Performed by: INTERNAL MEDICINE

## 2021-08-17 PROCEDURE — 76705 ECHO EXAM OF ABDOMEN: CPT

## 2021-08-17 PROCEDURE — 94799 UNLISTED PULMONARY SVC/PX: CPT

## 2021-08-17 PROCEDURE — 3E0333Z INTRODUCTION OF ANTI-INFLAMMATORY INTO PERIPHERAL VEIN, PERCUTANEOUS APPROACH: ICD-10-PCS | Performed by: HOSPITALIST

## 2021-08-17 PROCEDURE — 25010000002 SODIUM CHLORIDE 0.9 % WITH KCL 20 MEQ 20-0.9 MEQ/L-% SOLUTION: Performed by: HOSPITALIST

## 2021-08-17 PROCEDURE — 25010000002 AZITHROMYCIN PER 500 MG: Performed by: HOSPITALIST

## 2021-08-17 PROCEDURE — 85025 COMPLETE CBC W/AUTO DIFF WBC: CPT | Performed by: HOSPITALIST

## 2021-08-17 PROCEDURE — 85007 BL SMEAR W/DIFF WBC COUNT: CPT | Performed by: HOSPITALIST

## 2021-08-17 PROCEDURE — 82728 ASSAY OF FERRITIN: CPT | Performed by: HOSPITALIST

## 2021-08-17 PROCEDURE — 99254 IP/OBS CNSLTJ NEW/EST MOD 60: CPT | Performed by: INTERNAL MEDICINE

## 2021-08-17 PROCEDURE — 94664 DEMO&/EVAL PT USE INHALER: CPT

## 2021-08-17 PROCEDURE — 83036 HEMOGLOBIN GLYCOSYLATED A1C: CPT | Performed by: HOSPITALIST

## 2021-08-17 RX ADMIN — ENOXAPARIN SODIUM 40 MG: 40 INJECTION SUBCUTANEOUS at 15:13

## 2021-08-17 RX ADMIN — Medication 1000 UNITS: at 09:13

## 2021-08-17 RX ADMIN — CETIRIZINE HYDROCHLORIDE 10 MG: 10 TABLET ORAL at 09:12

## 2021-08-17 RX ADMIN — FAMOTIDINE 20 MG: 20 TABLET, FILM COATED ORAL at 09:12

## 2021-08-17 RX ADMIN — BUDESONIDE AND FORMOTEROL FUMARATE DIHYDRATE 2 PUFF: 160; 4.5 AEROSOL RESPIRATORY (INHALATION) at 11:05

## 2021-08-17 RX ADMIN — Medication 220 MG: at 12:33

## 2021-08-17 RX ADMIN — REMDESIVIR 100 MG: 100 INJECTION, POWDER, LYOPHILIZED, FOR SOLUTION INTRAVENOUS at 20:16

## 2021-08-17 RX ADMIN — CEFTRIAXONE SODIUM 2 G: 2 INJECTION, SOLUTION INTRAVENOUS at 15:13

## 2021-08-17 RX ADMIN — POTASSIUM CHLORIDE AND SODIUM CHLORIDE 75 ML/HR: 900; 150 INJECTION, SOLUTION INTRAVENOUS at 15:15

## 2021-08-17 RX ADMIN — REMDESIVIR 200 MG: 100 INJECTION, POWDER, LYOPHILIZED, FOR SOLUTION INTRAVENOUS at 00:39

## 2021-08-17 RX ADMIN — GUAIFENESIN 600 MG: 600 TABLET, EXTENDED RELEASE ORAL at 09:12

## 2021-08-17 RX ADMIN — BUDESONIDE AND FORMOTEROL FUMARATE DIHYDRATE 2 PUFF: 160; 4.5 AEROSOL RESPIRATORY (INHALATION) at 20:34

## 2021-08-17 RX ADMIN — DEXAMETHASONE SODIUM PHOSPHATE 6 MG: 10 INJECTION, SOLUTION INTRAMUSCULAR; INTRAVENOUS at 09:13

## 2021-08-17 RX ADMIN — AZITHROMYCIN 500 MG: 500 INJECTION, POWDER, LYOPHILIZED, FOR SOLUTION INTRAVENOUS at 17:12

## 2021-08-17 RX ADMIN — FAMOTIDINE 20 MG: 20 TABLET, FILM COATED ORAL at 20:15

## 2021-08-17 RX ADMIN — GUAIFENESIN 600 MG: 600 TABLET, EXTENDED RELEASE ORAL at 20:16

## 2021-08-17 RX ADMIN — OXYCODONE HYDROCHLORIDE AND ACETAMINOPHEN 500 MG: 500 TABLET ORAL at 09:12

## 2021-08-17 NOTE — PROGRESS NOTES
"Daily progress note    Chief complaint  Doing little better  Still with cough and shortness of breath  No new complaints  Denies any fever chills and body aches    History of present illness  56-year-old white male with no medical problem home medication who is an ex-smoker and drinks socially who has received 1 dose of COVID-19 vaccine earlier this month presented to [facility] emergency room with shortness of breath nonproductive cough congestion fatigue tired and developed fever today.  Patient denies any loss of taste and smell but has no appetite.  Patient has been tested positive for Covid 6 days ago.  Patient work-up in ER revealed COVID-19 pneumonia and possible bacterial pneumonia admit for management.  Patient also found to have hyponatremia and elevated liver enzymes with severe dehydration      REVIEW OF SYSTEMS  All systems reviewed and negative except for those discussed in HPI.      PHYSICAL EXAM   Blood pressure 118/85, pulse 97, temperature 97.7 °F (36.5 °C), temperature source Oral, resp. rate 18, height 175.3 cm (69\"), weight 98.9 kg (218 lb), SpO2 97 %.    GENERAL: Male that appears weak and tired.  No acute distress.  HENT: nares patent  Head/neck/ face are symmetric without gross deformity, signs of trauma, or swelling  EYES: no scleral icterus, no conjunctival pallor.  NECK: Supple, no meningismus  CV: regular rhythm, regular rate with intact distal pulses.  No murmur or rub  RESPIRATORY: normal effort and no respiratory distress.  Rhonchi in the base of his lungs bilaterally on auscultation.  ABDOMEN: soft and nontender.  Obese  MUSCULOSKELETAL: no deformity.  No edema.  Intact distal pulses to upper and lower extremities that are equal strong and symmetric.  NEURO: alert and appropriate, moves all extremities, follows commands.  No focal motor or sensory changes.  SKIN: warm, dry     LAB RESULTS  Lab Results (last 24 hours)     Procedure Component Value Units Date/Time    Blood " Culture - Blood, Arm, Right [[id number]] Collected: 08/16/21 1343    Specimen: Blood from Arm, Right Updated: 08/17/21 1430     Blood Culture No growth at 24 hours    Blood Culture - Blood, Arm, Left [[id number]] Collected: 08/16/21 1411    Specimen: Blood from Arm, Left Updated: 08/17/21 1430     Blood Culture No growth at 24 hours    Manual Differential [[id number]]  (Abnormal) Collected: 08/17/21 0901    Specimen: Blood Updated: 08/17/21 1034     Neutrophil % 79.6 %      Lymphocyte % 12.2 %      Monocyte % 6.1 %      Other Cells % 2.0 %      Neutrophils Absolute 4.00 10*3/mm3      Lymphocytes Absolute 0.61 10*3/mm3      Monocytes Absolute 0.31 10*3/mm3      Anisocytosis Slight/1+     Poikilocytes Slight/1+     Polychromasia Slight/1+     WBC Morphology Normal     Platelet Morphology Normal    CBC & Differential [[id number]]  (Normal) Collected: 08/17/21 0901    Specimen: Blood Updated: 08/17/21 1034    Narrative:      The following orders were created for panel order CBC & Differential.  Procedure                               Abnormality         Status                     ---------                               -----------         ------                     CBC Auto Differential[[id number]]        Normal              Final result                 Please view results for these tests on the individual orders.    CBC Auto Differential [[id number]]  (Normal) Collected: 08/17/21 0901    Specimen: Blood Updated: 08/17/21 1034     WBC 5.03 10*3/mm3      RBC 4.54 10*6/mm3      Hemoglobin 13.5 g/dL      Hematocrit 39.7 %      MCV 87.4 fL      MCH 29.7 pg      MCHC 34.0 g/dL      RDW 12.8 %      RDW-SD 40.5 fl      MPV 10.0 fL      Platelets 189 10*3/mm3     Ferritin [[id number]]  (Abnormal) Collected: 08/17/21 0901    Specimen: Blood Updated: 08/17/21 1026     Ferritin 5,773.00 ng/mL     Narrative:      Results may be falsely decreased if patient taking Biotin.      D-dimer, Quantitative [[id number]]  (Abnormal) Collected:  08/17/21 0901    Specimen: Blood Updated: 08/17/21 1008     D-Dimer, Quantitative 0.62 MCGFEU/mL     Narrative:      The Stago D-Dimer test used in conjunction with a clinical pretest probability (PTP) assessment model, has been approved by the FDA to rule out the presence of venous thromboembolism (VTE) in outpatients suspected of deep venous thrombosis (DVT) or pulmonary embolism (PE). The cut-off for negative predictive value is <0.50 MCGFEU/mL.    TSH [[id number]]  (Normal) Collected: 08/17/21 0901    Specimen: Blood Updated: 08/17/21 1006     TSH 1.410 uIU/mL     Comprehensive Metabolic Panel [[id number]]  (Abnormal) Collected: 08/17/21 0901    Specimen: Blood Updated: 08/17/21 1003     Glucose 96 mg/dL      BUN 6 mg/dL      Creatinine 0.76 mg/dL      Sodium 130 mmol/L      Potassium 3.6 mmol/L      Chloride 100 mmol/L      CO2 20.3 mmol/L      Calcium 8.1 mg/dL      Total Protein 6.3 g/dL      Albumin 3.20 g/dL      ALT (SGPT) 238 U/L      AST (SGOT) 305 U/L      Alkaline Phosphatase 104 U/L      Total Bilirubin 0.3 mg/dL      eGFR Non African Amer 105 mL/min/1.73      Globulin 3.1 gm/dL      A/G Ratio 1.0 g/dL      BUN/Creatinine Ratio 7.9     Anion Gap 9.7 mmol/L     Narrative:      GFR Normal >60  Chronic Kidney Disease <60  Kidney Failure <15      Bilirubin, Direct [[id number]]  (Normal) Collected: 08/17/21 0901    Specimen: Blood Updated: 08/17/21 1000     Bilirubin, Direct 0.2 mg/dL     C-reactive Protein [[id number]]  (Abnormal) Collected: 08/17/21 0901    Specimen: Blood Updated: 08/17/21 1000     C-Reactive Protein 8.60 mg/dL     Lipid Panel [[id number]]  (Abnormal) Collected: 08/17/21 0901    Specimen: Blood Updated: 08/17/21 1000     Total Cholesterol 94 mg/dL      Triglycerides 154 mg/dL      HDL Cholesterol 29 mg/dL      LDL Cholesterol  39 mg/dL      VLDL Cholesterol 26 mg/dL      LDL/HDL Ratio 1.18    Narrative:      Cholesterol Reference Ranges  (U.S. Department of Health and Human Services ATP III  Classifications)    Desirable          <200 mg/dL  Borderline High    200-239 mg/dL  High Risk          >240 mg/dL      Triglyceride Reference Ranges  (U.S. Department of Health and Human Services ATP III Classifications)    Normal           <150 mg/dL  Borderline High  150-199 mg/dL  High             200-499 mg/dL  Very High        >500 mg/dL    HDL Reference Ranges  (U.S. Department of Health and Human Services ATP III Classifcations)    Low     <40 mg/dl (major risk factor for CHD)  High    >60 mg/dl ('negative' risk factor for CHD)        LDL Reference Ranges  (U.S. Department of Health and Human Services ATP III Classifcations)    Optimal          <100 mg/dL  Near Optimal     100-129 mg/dL  Borderline High  130-159 mg/dL  High             160-189 mg/dL  Very High        >189 mg/dL    Hemoglobin A1c [[id number]]  (Normal) Collected: 08/17/21 0901    Specimen: Blood Updated: 08/17/21 0922     Hemoglobin A1C 4.90 %     Narrative:      Hemoglobin A1C Ranges:    Increased Risk for Diabetes  5.7% to 6.4%  Diabetes                     >= 6.5%  Diabetic Goal                < 7.0%    Hepatitis Panel, Acute [[id number]]  (Normal) Collected: 08/16/21 2049    Specimen: Blood Updated: 08/16/21 2211     Hepatitis B Surface Ag Non-Reactive     Hep A IgM Non-Reactive     Hep B C IgM Non-Reactive     Hepatitis C Ab Non-Reactive    Narrative:      Results may be falsely decreased if patient taking Biotin.     Legionella Antigen, Urine - Urine, Urine, Clean Catch [[id number]]  (Normal) Collected: 08/16/21 1437    Specimen: Urine, Clean Catch Updated: 08/16/21 2148     LEGIONELLA ANTIGEN, URINE Negative    S. Pneumo Ag Urine or CSF - Urine, Urine, Clean Catch [[id number]]  (Normal) Collected: 08/16/21 1437    Specimen: Urine, Clean Catch Updated: 08/16/21 2148     Strep Pneumo Ag Negative        Imaging Results (Last 24 Hours)     Procedure Component Value Units Date/Time    CT Angiogram Chest [[id number]] Collected: 08/16/21 1712      Updated: 08/17/21 0712    Narrative:      CT ANGIOGRAM OF THE CHEST. MULTIPLE CORONAL, SAGITTAL, AND 3-D  RECONSTRUCTIONS.     HISTORY: 56-year-old male with shortness of breath. Evaluate for  pulmonary thromboemboli.     TECHNIQUE: Radiation dose reduction techniques were utilized, including  automated exposure control and exposure modulation based on body size.   CT angiogram of the chest was performed following the administration of  IV contrast. Multiple coronal, sagittal, and 3-D reconstruction images  were obtained. There is no previous CT for comparison.     FINDINGS: There is adequate opacification of the pulmonary arteries and  there is no convincing evidence for pulmonary thromboemboli. There are  patchy and ill-defined ground-glass opacities throughout both lung  fields, both centrally and peripherally. There is moderately advanced  emphysematous change. There are no dense consolidations and there are no  pleural or pericardial effusions. There are shotty mediastinal nodes and  mildly enlarged hilar nodes. A small right renal cyst is noted.       Impression:      1. There is no evidence for pulmonary thromboemboli.  2. Central and peripheral patchy and diffuse ground-glass opacities  within both lung fields likely represents atypical pneumonia. The mildly  enlarged hilar nodes are likely reactive. Reevaluation is recommended  with a contrast enhanced chest CT in 3 months.     Discussed with Dr. Short.     This report was finalized on 8/17/2021 7:09 AM by Dr. Natalia Smith M.D.             Current Facility-Administered Medications:   •  acetaminophen (TYLENOL) tablet 650 mg, 650 mg, Oral, Q6H PRN, Jessee Alvarez MD, 650 mg at 08/16/21 1826  •  albuterol (PROVENTIL) nebulizer solution 0.083% 2.5 mg/3mL, 2.5 mg, Nebulization, Q6H PRN, Jessee Alvarez MD  •  ascorbic acid (VITAMIN C) tablet 500 mg, 500 mg, Oral, Daily, Jessee Alvarez MD, 500 mg at 08/17/21 0912  •  AZITHROMYCIN 500 MG/250 ML 0.9% NS IVPB  (vial-mate), 500 mg, Intravenous, Q24H, Jessee Alvarez MD, 500 mg at 08/16/21 2008  •  budesonide-formoterol (SYMBICORT) 160-4.5 MCG/ACT inhaler 2 puff, 2 puff, Inhalation, BID - RT, Jessee Alvarez MD, 2 puff at 08/17/21 1105  •  cefTRIAXone (ROCEPHIN) IVPB 2 g, 2 g, Intravenous, Q24H, Brennan Fajardo MD, Last Rate: 100 mL/hr at 08/17/21 1513, 2 g at 08/17/21 1513  •  cetirizine (zyrTEC) tablet 10 mg, 10 mg, Oral, Daily, Jessee Alvarez MD, 10 mg at 08/17/21 0912  •  cholecalciferol (VITAMIN D3) tablet 1,000 Units, 1,000 Units, Oral, Daily, Jessee Alvarez MD, 1,000 Units at 08/17/21 0913  •  dexamethasone sodium phosphate injection 6 mg, 6 mg, Intravenous, Daily, Brennan Fajardo MD, 6 mg at 08/17/21 0913  •  enoxaparin (LOVENOX) syringe 40 mg, 40 mg, Subcutaneous, Q24H, Jessee Alvarez MD, 40 mg at 08/17/21 1513  •  famotidine (PEPCID) tablet 20 mg, 20 mg, Oral, BID, Jessee Alvarez MD, 20 mg at 08/17/21 0912  •  guaiFENesin (MUCINEX) 12 hr tablet 600 mg, 600 mg, Oral, Q12H, Jessee Alvarez MD, 600 mg at 08/17/21 0912  •  nitroglycerin (NITROSTAT) SL tablet 0.4 mg, 0.4 mg, Sublingual, Q5 Min PRN, Jessee Alvarez MD  •  Pharmacy Consult - Remdesivir, , Does not apply, Continuous PRN, Brennan Fajardo MD  •  [COMPLETED] remdesivir 200 mg in sodium chloride 0.9 % 290 mL IVPB (powder vial), 200 mg, Intravenous, Q24H, 200 mg at 08/17/21 0039 **FOLLOWED BY** remdesivir 100 mg in sodium chloride 0.9 % 270 mL IVPB (powder vial), 100 mg, Intravenous, Q24H, Brennan Fajardo MD  •  [COMPLETED] Insert peripheral IV, , , Once **AND** sodium chloride 0.9 % flush 10 mL, 10 mL, Intravenous, PRN, Efra Prieto MD  •  sodium chloride 0.9 % with KCl 20 mEq/L infusion, 75 mL/hr, Intravenous, Continuous, Jessee Alvarez MD, Last Rate: 75 mL/hr at 08/16/21 2007, 75 mL/hr at 08/16/21 2007  •  zinc sulfate (ZINCATE) capsule 220 mg, 220 mg, Oral, Daily, Jessee Alvarez MD, 220 mg at 08/17/21 1233     ASSESSMENT  COVID-19 pneumonia with superadded bacterial  pneumonia  Hyponatremia  Severe dehydration  Elevated liver LFTs  Ex-smoker  Gastroesophageal reflux disease    PLAN  CPM  IVF  Decadron  Remdesivir  Continue antibiotics  Supplemental oxygen as needed  Symbicort twice daily and albuterol as needed  Symptomatic treatment for cough congestion and fever  Supportive care  Discussed with nursing staff  Follow closely further recommendation current hospital course    SUE MAS MD

## 2021-08-17 NOTE — PROGRESS NOTES
"  Infectious Diseases Progress Note    Brennan Fajardo MD     [facility]  [place]: 1 day  Patient Identification:  Name: Jeronimo Bain  Age: 56 y.o.  Sex: male  :  1965  MRN: [id number]         Primary Care Physician: Edgar Sanchez MD            Subjective: Feeling better breathing better and stronger.  Interval History: See consultation note.    Objective:    Scheduled Meds:vitamin C, 500 mg, Oral, Daily  azithromycin, 500 mg, Intravenous, Q24H  budesonide-formoterol, 2 puff, Inhalation, BID - RT  cefTRIAXone, 2 g, Intravenous, Q24H  cetirizine, 10 mg, Oral, Daily  cholecalciferol, 1,000 Units, Oral, Daily  dexamethasone, 6 mg, Intravenous, Daily  enoxaparin, 40 mg, Subcutaneous, Q24H  famotidine, 20 mg, Oral, BID  guaiFENesin, 600 mg, Oral, Q12H  remdesivir, 100 mg, Intravenous, Q24H  zinc sulfate, 220 mg, Oral, Daily      Continuous Infusions:Pharmacy Consult - Remdesivir,   sodium chloride 0.9 % with KCl 20 mEq, 75 mL/hr, Last Rate: 75 mL/hr (21 1515)        Vital signs in last 24 hours:  Temp:  [97.7 °F (36.5 °C)-99.1 °F (37.3 °C)] 97.7 °F (36.5 °C)  Heart Rate:  [75-97] 78  Resp:  [17-22] 17  BP: (116-119)/(78-85) 118/85    Intake/Output:    Intake/Output Summary (Last 24 hours) at 2021 1920  Last data filed at 2021 1515  Gross per 24 hour   Intake 1340 ml   Output 300 ml   Net 1040 ml       Exam:  /85 (BP Location: Right arm, Patient Position: Lying)   Pulse 78   Temp 97.7 °F (36.5 °C) (Oral)   Resp 17   Ht 175.3 cm (69\")   Wt 98.9 kg (218 lb)   SpO2 93%   BMI 32.19 kg/m²   Patient is examined using the personal protective equipment as per guidelines from infection control for this particular patient as enacted.  Hand washing was performed before and after patient interaction.  General Appearance:    Alert, cooperative, no distress, AAOx3  Patient examined from distance and only observation to his appearance respiratory status and overall outlook was made.   "     Data Review:    I reviewed the patient's new clinical results.  Results from last 7 days   Lab Units 08/17/21  0901 08/16/21  1340   WBC 10*3/mm3 5.03 4.38   HEMOGLOBIN g/dL 13.5 13.7   PLATELETS 10*3/mm3 189 178     Results from last 7 days   Lab Units 08/17/21  0901 08/16/21  1340   SODIUM mmol/L 130* 125*   POTASSIUM mmol/L 3.6 3.6   CHLORIDE mmol/L 100 92*   CO2 mmol/L 20.3* 22.8   BUN mg/dL 6 7   CREATININE mg/dL 0.76 0.87   CALCIUM mg/dL 8.1* 8.1*   GLUCOSE mg/dL 96 97     Blood Culture   Date Value Ref Range Status   08/16/2021 No growth at 24 hours  Preliminary     No results found for: BCIDPCR, CXREFLEX, CSFCX, CULTURETIS  No results found for: CULTURES, HSVCX, URCX  No results found for: EYECULTURE, GCCX, HSVCULTURE, LABHSV  No results found for: LEGIONELLA, MRSACX, MUMPSCX, MYCOPLASCX  No results found for: NOCARDIACX, STOOLCX  No results found for: THROATCX, UNSTIMCULT, URINECX, CULTURE, VZVCULTUR  No results found for: VIRALCULTU, WOUNDCX  Microbiology Results (last 10 days)     Procedure Component Value - Date/Time    Respiratory Panel PCR w/COVID-19(SARS-CoV-2) DANNY/ERICA/SERGIO/[facility] In-House, NP Swab in UTM/VTM, 3-4 HR TAT - Swab, Nasopharynx [[id number]]  (Abnormal) Collected: 08/16/21 1537    Lab Status: Final result Specimen: Swab from Nasopharynx Updated: 08/16/21 9081     ADENOVIRUS, PCR Not Detected     Coronavirus 229E Not Detected     Coronavirus HKU1 Not Detected     Coronavirus NL63 Not Detected     Coronavirus OC43 Not Detected     COVID19 Detected     Human Metapneumovirus Not Detected     Human Rhinovirus/Enterovirus Not Detected     Influenza A PCR Not Detected     Influenza B PCR Not Detected     Parainfluenza Virus 1 Not Detected     Parainfluenza Virus 2 Not Detected     Parainfluenza Virus 3 Not Detected     Parainfluenza Virus 4 Not Detected     RSV, PCR Not Detected     Bordetella pertussis pcr Not Detected     Bordetella parapertussis PCR Not Detected     Chlamydophila  pneumoniae PCR Not Detected     Mycoplasma pneumo by PCR Not Detected    Narrative:      In the setting of a positive respiratory panel with a viral infection PLUS a negative procalcitonin without other underlying concern for bacterial infection, consider observing off antibiotics or discontinuation of antibiotics and continue supportive care. If the respiratory panel is positive for atypical bacterial infection (Bordetella pertussis, Chlamydophila pneumoniae, or Mycoplasma pneumoniae), consider antibiotic de-escalation to target atypical bacterial infection.    Blood Culture - Blood, Arm, Left [[id number]] Collected: 08/16/21 1530    Lab Status: Preliminary result Specimen: Blood from Arm, Left Updated: 08/17/21 1545     Blood Culture No growth at 24 hours    S. Pneumo Ag Urine or CSF - Urine, Urine, Clean Catch [[id number]]  (Normal) Collected: 08/16/21 1437    Lab Status: Final result Specimen: Urine, Clean Catch Updated: 08/16/21 2148     Strep Pneumo Ag Negative    Legionella Antigen, Urine - Urine, Urine, Clean Catch [[id number]]  (Normal) Collected: 08/16/21 1437    Lab Status: Final result Specimen: Urine, Clean Catch Updated: 08/16/21 2148     LEGIONELLA ANTIGEN, URINE Negative    Blood Culture - Blood, Arm, Left [[id number]] Collected: 08/16/21 1411    Lab Status: Preliminary result Specimen: Blood from Arm, Left Updated: 08/17/21 1430     Blood Culture No growth at 24 hours    Blood Culture - Blood, Arm, Right [[id number]] Collected: 08/16/21 1343    Lab Status: Preliminary result Specimen: Blood from Arm, Right Updated: 08/17/21 1430     Blood Culture No growth at 24 hours            Assessment:  1-systemic COVID-19 infection with pneumonia and possible secondary bacterial process given the duration of his symptoms.  2-hypoxia episodically especially with activity and exertion makes him at high risk of progression of disease.  3-Hyponatremia likely multifactorial including SIADH due to lung infection and  associated dehydration due to decreased intake.  4-abnormal LFTs        Recommendations/Discussions:  · Continue with close monitoring of his respiratory status and symptom wellbeing and oxygen requirement.  · If his LFTs continue to get worse and remdesivir may have to be discontinued while continuing with steroids and completing 5-day course of empiric treatment for secondary bacterial infection given the atypical presentation that he has.  · Follow-up on liver ultrasound.  Brennan Fajardo MD  8/17/2021  19:20 EDT    Much of this encounter note is an electronic transcription/translation of spoken language to printed text. The electronic translation of spoken language may permit erroneous, or at times, nonsensical words or phrases to be inadvertently transcribed; Although I have reviewed the note for such errors, some may still exist

## 2021-08-17 NOTE — CONSULTS
"[facility]  Initial Inpatient Consult Note    Referring Provider: Dr. Alvarez, Dr Sanchez    Reason for Consultation: Elevated LFTs    Subjective     History of present illness:    56 y.o. male admitted for COVID-19 pneumonia with superadded bacterial pneumonia treated with remdesivir, azithromycin, and Rocephin.  Our service was consulted for elevated transaminases: ALT is 238, , alk phos is 104, total bilirubin is 0.3 albumin 3.2, sodium 130, triglycerides 154, normal acute hepatitis panel, normal CBC.  He denies personal history of liver disease and drinks alcohol only occasionally.  He is not aware of any family history of liver disease.  No IV drug use.  He did have a CT scan in April ordered by Dr. Sanchez (see care anywhere) showed hepatic steatosis and thickening of the gallbladder fundus.  He denies any recent ultrasounds.  He did have an upper endoscopy after the CT scan which he reports was normal.  Not able to locate this record.  He denies any GI symptoms to include abdominal pain, nausea, vomiting, rectal bleeding, melena, trouble swallowing, abnormal weight loss, abdominal distention, constipation, diarrhea, fever, chest pain.  He does have some shortness of air related to the COVID-19 infection however this is improving per patient.    Has a history of GERD currently treated with Pepcid 20 mg twice daily which works well for him.  He is a smoker.  He has never had a colonoscopy.  He did have the Cologuard test done with his PCP per patient and this was negative.    Past Medical History:  History reviewed. No pertinent past medical history.  Past Surgical History:  History reviewed. No pertinent surgical history.   Social History:   Social History     Tobacco Use   • Smoking status: Former Smoker     Packs/day: 1.00     Types: Cigarettes     Quit date: 2007     Years since quittin.5   • Smokeless tobacco: Never Used   • Tobacco comment: \"I quit 14 years ago.\" " "  Substance Use Topics   • Alcohol use: Yes     Comment: \"once a month probably\"      Family History:  History reviewed. No pertinent family history.    Home Meds:  No medications prior to admission.     Current Meds:   vitamin C, 500 mg, Oral, Daily  azithromycin, 500 mg, Intravenous, Q24H  budesonide-formoterol, 2 puff, Inhalation, BID - RT  cefTRIAXone, 2 g, Intravenous, Q24H  cetirizine, 10 mg, Oral, Daily  cholecalciferol, 1,000 Units, Oral, Daily  dexamethasone, 6 mg, Intravenous, Daily  enoxaparin, 40 mg, Subcutaneous, Q24H  famotidine, 20 mg, Oral, BID  guaiFENesin, 600 mg, Oral, Q12H  remdesivir, 100 mg, Intravenous, Q24H  zinc sulfate, 220 mg, Oral, Daily      Allergies:  No Known Allergies  Review of Systems  The following systems were reviewed and negative;  cardiovascular, gastrointestinal, genitourinary, musculoskeletal, neurological and behavioral/psych     Objective     Vital Signs  Temp:  [97.7 °F (36.5 °C)-100.9 °F (38.3 °C)] 97.7 °F (36.5 °C)  Heart Rate:  [] 82  Resp:  [18-22] 18  BP: (111-136)/(77-86) 118/85  Physical Exam:  General Appearance:    Alert, cooperative, in no acute distress   Head:    Normocephalic, without obvious abnormality, atraumatic   Eyes:          conjunctivae and sclerae normal, no icterus   Throat:   no thrush, oral mucosa moist   Neck:   Supple, trachea midline   Lungs:     Clear to auscultation on right, left base with mild crackling, on O2 via NC, normal respirations    Heart:    Regular rhythm and normal rate, no MGR   Chest Wall:    No abnormalities observed   Abdomen:     Soft, nondistended, nontender; normal bowel sounds no masses, no organomegaly   Extremities:   no edema, no redness   Skin:   No bruising or rash   Psychiatric:  normal mood and insight, normal affect     Results Review:   I reviewed the patient's new clinical results.    Results from last 7 days   Lab Units 08/17/21  0901 08/16/21  1340   WBC 10*3/mm3 5.03 4.38   HEMOGLOBIN g/dL 13.5 13.7 "   HEMATOCRIT % 39.7 39.3   PLATELETS 10*3/mm3 189 178     Results from last 7 days   Lab Units 08/17/21  0901 08/16/21  1340   SODIUM mmol/L 130* 125*   POTASSIUM mmol/L 3.6 3.6   CHLORIDE mmol/L 100 92*   CO2 mmol/L 20.3* 22.8   BUN mg/dL 6 7   CREATININE mg/dL 0.76 0.87   CALCIUM mg/dL 8.1* 8.1*   BILIRUBIN mg/dL 0.3 0.5   ALK PHOS U/L 104 99   ALT (SGPT) U/L 238* 143*   AST (SGOT) U/L 305* 203*   GLUCOSE mg/dL 96 97     Results from last 7 days   Lab Units 08/16/21  1340   INR  0.94     No results found for: LIPASE    Radiology:  CT Angiogram Chest   Final Result   1. There is no evidence for pulmonary thromboemboli.   2. Central and peripheral patchy and diffuse ground-glass opacities   within both lung fields likely represents atypical pneumonia. The mildly   enlarged hilar nodes are likely reactive. Reevaluation is recommended   with a contrast enhanced chest CT in 3 months.       Discussed with Dr. Shotr.       This report was finalized on 8/17/2021 7:09 AM by Dr. Natalia Smith M.D.          XR Chest 1 View   Final Result      US Gallbladder    (Results Pending)       Assessment/Plan   Patient Active Problem List   Diagnosis   • Pneumonia due to COVID-19 virus       Assessment:  1. Elevated transaminases  2. Pneumonia due to COVID-19  3. Abnormal CT scan in April, thickened gallbladder fundus    Plan:  · Proceed with liver ultrasound as ordered to evaluate elevated liver functions and thickened gallbladder fundus  · Continue to monitor LFTs   · Elevated transaminases possibly due to COVID-19 virus in which case they should resolve as the infection resolves.  Differential would exclude ischemia from decreased oxygen as well as drug-induced.  He appears to be breathing well on nasal cannula without respiratory distress at this time.  · Consider further work-up for metabolic, genetic, and autoimmune sources pending ultrasound and if elevation is persistent or worsening.    I discussed the patients findings and my  recommendations with patient.    Lachelle No PA-C        I have seen and examined the patient.  I have discussed the case with my physician assistant Lachelle No.  I have verified her HPI and review of systems. I have personally reviewed all the patient's past medical and social history, medications, and pertinent diagnostic data from the chart.  I agree with the assessment and plan as documented above.         Physical Exam:              General: patient awake, alert and cooperative              Eyes: Normal lids and lashes, no scleral icterus              Neck: supple, normal ROM              Skin: warm and dry, not jaundiced              Cardiovascular: regular rhythm and rate, no murmurs auscultated              Pulm: clear to auscultation bilaterally, regular and unlabored              Abdomen: soft, nontender, nondistended; normal bowel sounds              Extremities: no rash or edema              Psychiatric: Normal mood and behavior; memory intact    Elevated liver tests in a COVID-19 patient. We can see elevated liver tests in COVID-19 patients in upwards of 50% of people with this acute viral illness  It resolves on its own with the resolution of active viral particles  Follow-up ultrasound  Follow liver tests daily, while hospitalized  No further work-up required at this time    Hubert De Leon MD  [facility]

## 2021-08-17 NOTE — PLAN OF CARE
Goal Outcome Evaluation:           Progress: no change  Outcome Summary: Patient alert and oriented. On 4 liters of oxygen. On IV fluids. Vital signs are stable. Encouraged patient to use incentive spirometry. Patient got up to the chair today. No s/s of acute distress. Will contiue to monitor.

## 2021-08-17 NOTE — CASE MANAGEMENT/SOCIAL WORK
Discharge Planning Assessment  [facility]     Patient Name: Jeronimo Bain  MRN: [id number]  Today's Date: 8/17/2021    Admit Date: 8/16/2021    Discharge Needs Assessment     Row Name 08/17/21 1712       Living Environment    Lives With  alone    Current Living Arrangements  home/apartment/condo    Primary Care Provided by  self;spouse/significant other    Provides Primary Care For  no one, unable/limited ability to care for self    Family Caregiver if Needed  significant other    Family Caregiver Names  significant other Sis Stroud [phone]    Quality of Family Relationships  helpful;involved;supportive    Able to Return to Prior Arrangements  yes       Resource/Environmental Concerns    Resource/Environmental Concerns  home accessibility    Home Accessibility Concerns  stairs to enter home       Transition Planning    Patient/Family Anticipates Transition to  home    Patient/Family Anticipated Services at Transition  none    Transportation Anticipated  family or friend will provide       Discharge Needs Assessment    Equipment Currently Used at Home  none    Concerns to be Addressed  discharge planning    Anticipated Changes Related to Illness  none    Equipment Needed After Discharge  none    Current Discharge Risk  physical impairment        Discharge Plan     Row Name 08/17/21 1717       Plan    Plan  Plans home; denies needs.  Follow for any home O2 needs.    Provided Post Acute Provider List?  N/A    N/A Provider List Comment  The patient was not provided with a HH/SNF list nor a print out of the HH/nursing home compare list from Medicare.gov as he currently denies any d/c needs.    Provided Post Acute Provider Quality & Resource List?  N/A    N/A Quality & Resource List Comment  The patient was not provided with a HH/SNF list nor a print out of the HH/nursing home compare list from Medicare.gov as he currently denies any d/c needs.    Patient/Family in Agreement with Plan  yes    Plan Comments   Spoke to the patient; explained role of CCP, verified facesheet and discussed discharge planning needs. The patient plans to return home upon d/c where he resides alone but has assistance from his significant other Sis Stroud [phone].  The patient uses no DME, has 6 steps with 2 handrails to enter the 2 story home with a basement and has everything that he needs on the main level in the home.  The patient’s PCP is Edgar Sanchez, pharmacy is [facility] on [address] and [place] and he denies any trouble remembering to take his medication or with affording his medication.  The patient denies any HH/SNF history, denies any POA documents, states that he drives himself to his appointments and his significant other will transport him home upon d/c.  The patient was not provided with a HH/SNF list nor a print out of the HH/nursing home compare list from Medicare.gov as he currently denies any d/c needs.  The patient states that if home O2 is needed that he would be agreeable to using [facility].  CCP will follow to see if the patient is d/c on Symbicort, Lovenox or any IV antibiotics as he is currently on IV Azithromycin and IV Rocephin.  MAYNOR Sutton        Continued Care and Services - Admitted Since 8/16/2021    Coordination has not been started for this encounter.       Expected Discharge Date and Time     Expected Discharge Date Expected Discharge Time    Aug 21, 2021         Demographic Summary     Row Name 08/17/21 1711       General Information    Admission Type  inpatient    Arrived From  home    Referral Source  admission list    Reason for Consult  discharge planning    Preferred Language  English     Used During This Interaction  no        Functional Status     Row Name 08/17/21 1711       Functional Status    Usual Activity Tolerance  good    Current Activity Tolerance  moderate       Functional Status, IADL    Medications  independent    Meal Preparation  independent    Housekeeping   independent    Laundry  independent    Shopping  independent       Mental Status Summary    Recent Changes in Mental Status/Cognitive Functioning  no changes        Psychosocial    No documentation.       Abuse/Neglect    No documentation.       Legal    No documentation.       Substance Abuse    No documentation.       Patient Forms    No documentation.           MAYNOR Hussein

## 2021-08-17 NOTE — CONSULTS
CONSULT NOTE    Infectious Diseases - Brennan Ruth MD  [facility]       Patient Identification:  Name: Jeronimo Bain  Age: 56 y.o.  Sex: male  :  1965  MRN: [id number]             Date of Consultation: 2021      Primary Care Physician: Edgar Sanchez MD                               Requesting Physician:   Reason for Consultation: COVID-19 infection.    Impression: Patient is a 56-year-old male who was tested positive for COVID-19 8 days ago and recently his grandmother was hospitalized with COVID-19 infection and passed away came to the emergency room for feeling worse in terms of fever shortness of breath decreased appetite and not eating and drinking much.  Work-up in the emergency room revealed lung infiltrate and initially preserved oxygenation on room air.  Because of the elevated inflammatory markers patient was thought to have secondary bacterial pneumonia and associated hyponatremia.  Empiric antibiotic therapy for pneumonia initiated and infectious disease service is consulted.  During my evaluation patient dropped his oxygen saturation as low as 88% on room air and then he stays quite climbs back to about 90%.  This presentation in the above context is concerning for:  1-systemic COVID-19 infection with pneumonia and possible secondary bacterial process given the duration of his symptoms.  2-hypoxia episodically especially with activity and exertion makes him at high risk of progression of disease.  3-Hyponatremia likely multifactorial including SIADH due to lung infection and associated dehydration due to decreased intake.      Recommendations/Discussions:  At this juncture follow-up on blood cultures check urine for Legionella and pneumococcal antigen and continue treatment for bacterial pneumonia.  Given his presentation and risk for progression and episodic hypoxia that he is exhibiting with obvious respiratory distress that he is not with activity I think he  is a candidate for oxygen supplementation, remdesivir and dexamethasone.  Monitor closely for progression of illness manifested as increasing oxygen requirement and progressive hypoxia as well as monitor Covid inflammation markers.  Thank you Dr. Bullock for letting me be the part of your patient care please see above impression and recommendations      History of Present Illness:   Patient is a 56-year-old male who was tested positive for COVID-19 8 days ago and recently his grandmother was hospitalized with COVID-19 infection and passed away came to the emergency room for feeling worse in terms of fever shortness of breath decreased appetite and not eating and drinking much.  Work-up in the emergency room revealed lung infiltrate and initially preserved oxygenation on room air.  Because of the elevated inflammatory markers patient was thought to have secondary bacterial pneumonia and associated hyponatremia.  Empiric antibiotic therapy for pneumonia initiated and infectious disease service is consulted.  During my evaluation patient dropped his oxygen saturation as low as 88% on room air and then he stays quite climbs back to about 90%.  Apparently patient just received his first dose of COVID-19 vaccine a day before symptoms started.      Past Medical History:  History reviewed. No pertinent past medical history.  Past Surgical History:  History reviewed. No pertinent surgical history.   Home Meds:  No medications prior to admission.     Current Meds:     Current Facility-Administered Medications:   •  acetaminophen (TYLENOL) tablet 650 mg, 650 mg, Oral, Q6H PRN, Jessee Alvarez MD, 650 mg at 08/16/21 1826  •  albuterol (PROVENTIL) nebulizer solution 0.083% 2.5 mg/3mL, 2.5 mg, Nebulization, Q6H PRN, Jessee Alvarez MD  •  ascorbic acid (VITAMIN C) tablet 500 mg, 500 mg, Oral, Daily, Jessee Alvarez MD, 500 mg at 08/16/21 2007  •  AZITHROMYCIN 500 MG/250 ML 0.9% NS IVPB (vial-mate), 500 mg, Intravenous, Q24H, Kim  "MD Jessee, 500 mg at 21  •  budesonide-formoterol (SYMBICORT) 160-4.5 MCG/ACT inhaler 2 puff, 2 puff, Inhalation, BID - RT, Jessee Alvarez MD  •  cefTRIAXone (ROCEPHIN) IVPB 2 g, 2 g, Intravenous, Q24H, Jessee Alvarez MD  •  cetirizine (zyrTEC) tablet 10 mg, 10 mg, Oral, Daily, Jessee Alvarez MD, 10 mg at 21  •  cholecalciferol (VITAMIN D3) tablet 1,000 Units, 1,000 Units, Oral, Daily, Jessee Alvarez MD, 1,000 Units at 21  •  enoxaparin (LOVENOX) syringe 40 mg, 40 mg, Subcutaneous, Q24H, Jessee Alvarez MD, 40 mg at 21  •  famotidine (PEPCID) tablet 20 mg, 20 mg, Oral, BID, Jessee Alvarez MD, 20 mg at 21  •  guaiFENesin (MUCINEX) 12 hr tablet 600 mg, 600 mg, Oral, Q12H, Jessee Alvarez MD, 600 mg at 21  •  nitroglycerin (NITROSTAT) SL tablet 0.4 mg, 0.4 mg, Sublingual, Q5 Min PRN, Jessee Alvarez MD  •  [COMPLETED] Insert peripheral IV, , , Once **AND** sodium chloride 0.9 % flush 10 mL, 10 mL, Intravenous, PRN, Efra Prieto MD  •  sodium chloride 0.9 % with KCl 20 mEq/L infusion, 75 mL/hr, Intravenous, Continuous, Jessee Alvarez MD, Last Rate: 75 mL/hr at 21, 75 mL/hr at 21  •  zinc sulfate (ZINCATE) capsule 220 mg, 220 mg, Oral, Daily, Jessee Alvarez MD, 220 mg at 21  Allergies:  No Known Allergies  Social History:   Social History     Tobacco Use   • Smoking status: Former Smoker     Packs/day: 1.00     Types: Cigarettes     Quit date: 2007     Years since quittin.5   • Smokeless tobacco: Never Used   • Tobacco comment: \"I quit 14 years ago.\"   Substance Use Topics   • Alcohol use: Yes     Comment: \"once a month probably\"      Family History:  History reviewed. No pertinent family history.       Review of Systems  See history of present illness and past medical history.  As noted in the history of presenting illness.    Remainder of ROS is negative.      Vitals:   /80 (BP Location: Right arm, Patient Position: " "Lying)   Pulse 101   Temp (!) 100.9 °F (38.3 °C) (Oral)   Resp 18   Ht 175.3 cm (69\")   Wt 98.9 kg (218 lb)   SpO2 92%   BMI 32.19 kg/m²   I/O:     Intake/Output Summary (Last 24 hours) at 8/16/2021 2057  Last data filed at 8/16/2021 1632  Gross per 24 hour   Intake 1350 ml   Output --   Net 1350 ml     Exam:  Patient is examined using the personal protective equipment as per guidelines from infection control for this particular patient as enacted.  Hand washing was performed before and after patient interaction.  General Appearance:   Awake interactive male who appears to be distressed.   Head:    Normocephalic, without obvious abnormality, atraumatic   Eyes:    PERRL, conjunctivae/corneas clear, EOM's intact, both eyes   Ears:    Normal external ear canals, both ears   Nose:   Nares normal, septum midline, mucosa normal, no drainage    or sinus tenderness   Throat:   Lips, tongue, gums normal; oral mucosa pink and moist   Neck:   Supple, symmetrical, trachea midline, no adenopathy;     thyroid:  no enlargement/tenderness/nodules; no carotid    bruit or JVD   Back:     Symmetric, no curvature, ROM normal, no CVA tenderness   Lungs:    Bibasilar crackles.   Chest Wall:    No tenderness or deformity    Heart:    Regular rate and rhythm, S1 and S2 normal, no murmur, rub   or gallop   Abdomen:     Soft, non-tender, bowel sounds active all four quadrants,     no masses, no hepatomegaly, no splenomegaly   Extremities:   Extremities normal, atraumatic, no cyanosis or edema   Pulses:   Pulses palpable in all extremities; symmetric all extremities   Skin:   Skin color normal, Skin is warm and dry,  no rashes or palpable lesions   Neurologic:  Grossly nonfocal       Data Review:    I reviewed the patient's new clinical results.  Results from last 7 days   Lab Units 08/16/21  1340   WBC 10*3/mm3 4.38   HEMOGLOBIN g/dL 13.7   PLATELETS 10*3/mm3 178     Results from last 7 days   Lab Units 08/16/21  1340   SODIUM mmol/L " 125*   POTASSIUM mmol/L 3.6   CHLORIDE mmol/L 92*   CO2 mmol/L 22.8   BUN mg/dL 7   CREATININE mg/dL 0.87   CALCIUM mg/dL 8.1*   GLUCOSE mg/dL 97     No results found for: CULTURE]  Microbiology Results (last 10 days)     Procedure Component Value - Date/Time    Respiratory Panel PCR w/COVID-19(SARS-CoV-2) DANNY/ERICA/SERGIO/[facility] In-House, NP Swab in UTM/VTM, 3-4 HR TAT - Swab, Nasopharynx [[id number]]  (Abnormal) Collected: 08/16/21 1537    Lab Status: Final result Specimen: Swab from Nasopharynx Updated: 08/16/21 4522     ADENOVIRUS, PCR Not Detected     Coronavirus 229E Not Detected     Coronavirus HKU1 Not Detected     Coronavirus NL63 Not Detected     Coronavirus OC43 Not Detected     COVID19 Detected     Human Metapneumovirus Not Detected     Human Rhinovirus/Enterovirus Not Detected     Influenza A PCR Not Detected     Influenza B PCR Not Detected     Parainfluenza Virus 1 Not Detected     Parainfluenza Virus 2 Not Detected     Parainfluenza Virus 3 Not Detected     Parainfluenza Virus 4 Not Detected     RSV, PCR Not Detected     Bordetella pertussis pcr Not Detected     Bordetella parapertussis PCR Not Detected     Chlamydophila pneumoniae PCR Not Detected     Mycoplasma pneumo by PCR Not Detected    Narrative:      In the setting of a positive respiratory panel with a viral infection PLUS a negative procalcitonin without other underlying concern for bacterial infection, consider observing off antibiotics or discontinuation of antibiotics and continue supportive care. If the respiratory panel is positive for atypical bacterial infection (Bordetella pertussis, Chlamydophila pneumoniae, or Mycoplasma pneumoniae), consider antibiotic de-escalation to target atypical bacterial infection.          Assessment:  Active Hospital Problems    Diagnosis  POA   • Pneumonia due to COVID-19 virus [U07.1, J12.82]  Yes      Resolved Hospital Problems   No resolved problems to display.         Plan:  See above  Jawed  MD Scotty   8/16/2021  20:57 EDT    Much of this encounter note is an electronic transcription/translation of spoken language to printed text. The electronic translation of spoken language may permit erroneous, or at times, nonsensical words or phrases to be inadvertently transcribed; Although I have reviewed the note for such errors, some may still exist

## 2021-08-17 NOTE — PAYOR COMM NOTE
"Jeronimo Calero (56 y.o. Male)     PLEASE SEE ATTACHED INFORMATION FOR INPT AUTH.     PLEASE CALL  OR  [phone] WITH INPT AUTH.    THANK YOU    LANDY PAGE LPN CCP    Date of Birth Social Security Number Address Home Phone MRN    1965  [id number] [facility] [address] [phone] [id number]    [employer] Marital Status          Unknown Single       Admission Date Admission Type Admitting Provider Attending Provider Department, Room/Bed    8/16/21 Emergency Jessee Alvarez MD Ahmed, Aftab, MD [address], [address]    Discharge Date Discharge Disposition Discharge Destination                       Attending Provider: Jessee Alvarez MD    Allergies: No Known Allergies    Isolation: Enh Drop/Con   Infection: COVID (confirmed) (08/16/21)   Code Status: CPR    Ht: 175.3 cm (69\")   Wt: 98.9 kg (218 lb)    Admission Cmt: None   Principal Problem: None                Active Insurance as of 8/16/2021     Primary Coverage     Payor Plan Insurance Group Employer/Plan Group    [employer] [id number]     Payor Plan Address Payor Plan Phone Number Payor Plan Fax Number Effective Dates    PO    8/1/2021 - None Entered    [facility] [id number]       Subscriber Name Subscriber Birth Date Member ID       JERONIMO CALERO 1965 [id number]                 Emergency Contacts      (Rel.) Home Phone Work Phone Mobile Phone    MARGO HICKEY (Friend) [phone] -- [phone]               History & Physical      Jessee Alvarez MD at 08/16/21 1634          History and physical     Primary care physician  Dr. Sanchez    Chief complaint  Shortness of breath  Nonproductive cough  Generalized weakness    History of present illness  56-year-old white male with no medical problem home medication who is an ex-smoker and drinks socially who has received 1 dose of COVID-19 vaccine earlier this month presented to [facility] emergency room " "with shortness of breath nonproductive cough congestion fatigue tired and developed fever today.  Patient denies any loss of taste and smell but has no appetite.  Patient has been tested positive for Covid 6 days ago.  Patient work-up in ER revealed COVID-19 pneumonia and possible bacterial pneumonia admit for management.  Patient also found to have hyponatremia and elevated liver enzymes with severe dehydration     PAST MEDICAL HISTORY   Unremarkable      PAST SURGICAL HISTORY     History reviewed. No pertinent surgical history.     FAMILY HISTORY  History reviewed. No pertinent family history.     SOCIAL HISTORY                 Socioeconomic History   • Marital status: Single       Spouse name: Not on file   • Number of children: Not on file   • Years of education: Not on file   • Highest education level: Not on file   Tobacco Use   • Smoking status: Former Smoker       Packs/day: 1.00       Types: Cigarettes       Quit date: 2007       Years since quittin.5   • Smokeless tobacco: Never Used   • Tobacco comment: \"I quit 14 years ago.\"   Substance and Sexual Activity   • Alcohol use: Yes       Comment: \"once a month probably\"   • Drug use: Never         ALLERGIES  Patient has no known allergies.  No home medications     REVIEW OF SYSTEMS  All systems reviewed and negative except for those discussed in HPI.      PHYSICAL EXAM   Blood pressure 124/75, pulse 87, temperature 97.1 °F (36.2 °C), resp. rate 18, height 175.3 cm (69\"), weight 97.5 kg (215 lb), SpO2 95 %.    GENERAL: Male that appears weak and tired.  No acute distress.  HENT: nares patent  Head/neck/ face are symmetric without gross deformity, signs of trauma, or swelling  EYES: no scleral icterus, no conjunctival pallor.  NECK: Supple, no meningismus  CV: regular rhythm, regular rate with intact distal pulses.  No murmur or rub  RESPIRATORY: normal effort and no respiratory distress.  Rhonchi in the base of his lungs bilaterally on " "auscultation.  ABDOMEN: soft and nontender.  Obese  MUSCULOSKELETAL: no deformity.  No edema.  Intact distal pulses to upper and lower extremities that are equal strong and symmetric.  NEURO: alert and appropriate, moves all extremities, follows commands.  No focal motor or sensory changes.  SKIN: warm, dry     LAB RESULTS  Lab Results (last 24 hours)     Procedure Component Value Units Date/Time    Blood Culture - Blood, Arm, Left [[id number]] Collected: 08/16/21 1530    Specimen: Blood from Arm, Left Updated: 08/16/21 1542    Procalcitonin [[id number]]  (Abnormal) Collected: 08/16/21 1340    Specimen: Blood Updated: 08/16/21 1501     Procalcitonin 0.42 ng/mL     Narrative:      As a Marker for Sepsis (Non-Neonates):     1. <0.5 ng/mL represents a low risk of severe sepsis and/or septic shock.  2. >2 ng/mL represents a high risk of severe sepsis and/or septic shock.    As a Marker for Lower Respiratory Tract Infections that require antibiotic therapy:  PCT on Admission     Antibiotic Therapy             6-12 Hrs later  >0.5                          Strongly Recommended            >0.25 - <0.5             Recommended  0.1 - 0.25                  Discouraged                       Remeasure/reassess PCT  <0.1                         Strongly Discouraged         Remeasure/reassess PCT      As 28 day mortality risk marker: \"Change in Procalcitonin Result\" (>80% or <=80%) if Day 0 (or Day 1) and Day 4 values are available. Refer to http://www.[facility]-pct-calculator.com/    Change in PCT <=80 %   A decrease of PCT levels below or equal to 80% defines a positive change in PCT test result representing a higher risk for 28-day all-cause mortality of patients diagnosed with severe sepsis or septic shock.    Change in PCT >80 %   A decrease of PCT levels of more than 80% defines a negative change in PCT result representing a lower risk for 28-day all-cause mortality of patients diagnosed with severe sepsis or septic " shock.              Results may be falsely decreased if patient taking Biotin.     Troponin [[id number]]  (Normal) Collected: 08/16/21 1340    Specimen: Blood Updated: 08/16/21 1501     Troponin T <0.010 ng/mL     Narrative:      Troponin T Reference Range:  <= 0.03 ng/mL-   Negative for AMI  >0.03 ng/mL-     Abnormal for myocardial necrosis.  Clinicians would have to utilize clinical acumen, EKG, Troponin and serial changes to determine if it is an Acute Myocardial Infarction or myocardial injury due to an underlying chronic condition.       Results may be falsely decreased if patient taking Biotin.      BNP [[id number]]  (Normal) Collected: 08/16/21 1340    Specimen: Blood Updated: 08/16/21 1501     proBNP 210.2 pg/mL     Narrative:      Among patients with dyspnea, NT-proBNP is highly sensitive for the detection of acute congestive heart failure. In addition NT-proBNP of <300 pg/ml effectively rules out acute congestive heart failure with 99% negative predictive value.    Results may be falsely decreased if patient taking Biotin.      Manual Differential [[id number]]  (Abnormal) Collected: 08/16/21 1340    Specimen: Blood Updated: 08/16/21 1501     Neutrophil % 82.8 %      Lymphocyte % 10.1 %      Monocyte % 7.1 %      Neutrophils Absolute 3.63 10*3/mm3      Lymphocytes Absolute 0.44 10*3/mm3      Monocytes Absolute 0.31 10*3/mm3      RBC Morphology Normal     Smudge Cells Slight/1+     Platelet Morphology Normal    CBC & Differential [[id number]]  (Normal) Collected: 08/16/21 1340    Specimen: Blood Updated: 08/16/21 1500    Narrative:      The following orders were created for panel order CBC & Differential.  Procedure                               Abnormality         Status                     ---------                               -----------         ------                     CBC Auto Differential[[id number]]        Normal              Final result                 Please view results for these tests on the  individual orders.    CBC Auto Differential [[id number]]  (Normal) Collected: 08/16/21 1340    Specimen: Blood Updated: 08/16/21 1500     WBC 4.38 10*3/mm3      RBC 4.54 10*6/mm3      Hemoglobin 13.7 g/dL      Hematocrit 39.3 %      MCV 86.6 fL      MCH 30.2 pg      MCHC 34.9 g/dL      RDW 12.6 %      RDW-SD 39.7 fl      MPV 10.5 fL      Platelets 178 10*3/mm3     Comprehensive Metabolic Panel [[id number]]  (Abnormal) Collected: 08/16/21 1340    Specimen: Blood Updated: 08/16/21 1456     Glucose 97 mg/dL      BUN 7 mg/dL      Creatinine 0.87 mg/dL      Sodium 125 mmol/L      Potassium 3.6 mmol/L      Chloride 92 mmol/L      CO2 22.8 mmol/L      Calcium 8.1 mg/dL      Total Protein 5.7 g/dL      Albumin 3.80 g/dL      ALT (SGPT) 143 U/L      AST (SGOT) 203 U/L      Alkaline Phosphatase 99 U/L      Total Bilirubin 0.5 mg/dL      eGFR Non African Amer 90 mL/min/1.73      Globulin 1.9 gm/dL      A/G Ratio 2.0 g/dL      BUN/Creatinine Ratio 8.0     Anion Gap 10.2 mmol/L     Narrative:      GFR Normal >60  Chronic Kidney Disease <60  Kidney Failure <15      C-reactive Protein [[id number]]  (Abnormal) Collected: 08/16/21 1340    Specimen: Blood Updated: 08/16/21 1456     C-Reactive Protein 7.08 mg/dL     Magnesium [[id number]]  (Normal) Collected: 08/16/21 1340    Specimen: Blood Updated: 08/16/21 1456     Magnesium 2.1 mg/dL     Urinalysis, Microscopic Only - Urine, Clean Catch [[id number]]  (Abnormal) Collected: 08/16/21 1437    Specimen: Urine, Clean Catch Updated: 08/16/21 1456     RBC, UA 0-2 /HPF      WBC, UA 3-5 /HPF      Bacteria, UA None Seen /HPF      Squamous Epithelial Cells, UA 0-2 /HPF      Hyaline Casts, UA 0-2 /LPF      Methodology Automated Microscopy    Urinalysis With Microscopic If Indicated (No Culture) - Urine, Clean Catch [[id number]]  (Abnormal) Collected: 08/16/21 1437    Specimen: Urine, Clean Catch Updated: 08/16/21 1451     Color, UA Dark Yellow     Appearance, UA Clear     pH, UA 6.5     Specific  Gravity, UA 1.021     Glucose, UA Negative     Ketones, UA Trace     Bilirubin, UA Negative     Blood, UA Negative     Protein, UA 30 mg/dL (1+)     Leuk Esterase, UA Negative     Nitrite, UA Negative     Urobilinogen, UA 2.0 E.U./dL    Lactic Acid, Plasma [[id number]]  (Normal) Collected: 08/16/21 1411    Specimen: Blood Updated: 08/16/21 1448     Lactate 1.0 mmol/L     Protime-INR [[id number]]  (Normal) Collected: 08/16/21 1340    Specimen: Blood Updated: 08/16/21 1442     Protime 12.4 Seconds      INR 0.94    D-dimer, Quantitative [[id number]]  (Abnormal) Collected: 08/16/21 1340    Specimen: Blood Updated: 08/16/21 1442     D-Dimer, Quantitative 0.64 MCGFEU/mL     Narrative:      The Stago D-Dimer test used in conjunction with a clinical pretest probability (PTP) assessment model, has been approved by the FDA to rule out the presence of venous thromboembolism (VTE) in outpatients suspected of deep venous thrombosis (DVT) or pulmonary embolism (PE). The cut-off for negative predictive value is <0.50 MCGFEU/mL.    Blood Culture - Blood, Arm, Right [[id number]] Collected: 08/16/21 1343    Specimen: Blood from Arm, Right Updated: 08/16/21 1425    Blood Culture - Blood, Arm, Left [[id number]] Collected: 08/16/21 1411    Specimen: Blood from Arm, Left Updated: 08/16/21 1425        Imaging Results (Last 24 Hours)     Procedure Component Value Units Date/Time    XR Chest 1 View [[id number]] Collected: 08/16/21 1349     Updated: 08/16/21 1501    Narrative:      ONE VIEW PORTABLE CHEST     HISTORY: Positive COVID-19 infection. Weakness.     There is some faint consolidation at the periphery of both lungs highly  suspicious for changes of COVID-19 pneumonia and continued follow-up  evaluation may be helpful. The heart is borderline enlarged.     This report was finalized on 8/16/2021 2:58 PM by Dr. Sumit Raza M.D.             Current Facility-Administered Medications:   •  acetaminophen (TYLENOL) tablet 650 mg, 650 mg,  Oral, Q6H PRN, Sue Alvarez MD  •  albuterol (PROVENTIL) nebulizer solution 0.083% 2.5 mg/3mL, 2.5 mg, Nebulization, Q6H PRN, Sue Alvarez MD  •  ascorbic acid (VITAMIN C) tablet 500 mg, 500 mg, Oral, Daily, Sue Alvarez MD  •  AZITHROMYCIN 500 MG/250 ML 0.9% NS IVPB (vial-mate), 500 mg, Intravenous, Q24H, Sue Alvarez MD  •  budesonide-formoterol (SYMBICORT) 160-4.5 MCG/ACT inhaler 2 puff, 2 puff, Inhalation, BID - RT, Sue Alvarez MD  •  cefTRIAXone (ROCEPHIN) IVPB 2 g, 2 g, Intravenous, Q24H, Sue Alvarez MD  •  cetirizine (zyrTEC) tablet 10 mg, 10 mg, Oral, Daily, Sue Alvarez MD  •  cholecalciferol (VITAMIN D3) tablet 1,000 Units, 1,000 Units, Oral, Daily, Sue Alvarez MD  •  enoxaparin (LOVENOX) syringe 40 mg, 40 mg, Subcutaneous, Q24H, Sue Alvarez MD  •  famotidine (PEPCID) tablet 20 mg, 20 mg, Oral, BID, Sue Alvarez MD  •  guaiFENesin (MUCINEX) 12 hr tablet 600 mg, 600 mg, Oral, Q12H, Sue Alvarez MD  •  [COMPLETED] Insert peripheral IV, , , Once **AND** sodium chloride 0.9 % flush 10 mL, 10 mL, Intravenous, PRN, Efra Prieto MD  •  sodium chloride 0.9 % with KCl 20 mEq/L infusion, 75 mL/hr, Intravenous, Continuous, Sue Alvarez MD  •  zinc sulfate (ZINCATE) capsule 220 mg, 220 mg, Oral, Daily, Sue Alvarez MD  No current outpatient medications on file.     ASSESSMENT  COVID-19 pneumonia with superadded bacterial pneumonia  Hyponatremia  Severe dehydration  Elevated liver LFTs  Ex-smoker  Gastroesophageal reflux disease    PLAN  Admit  IVF  Empiric antibiotics  Supplemental oxygen as needed  Symbicort twice daily and albuterol as needed  Symptomatic treatment for cough congestion and fever  No indication for Decadron or remdesivir  Supportive care  Patient is full code  Discussed with nursing staff  Follow closely further recommendation current hospital course    SUE ALVAREZ MD            Electronically signed by Sue Alvarez MD at 08/16/21 8218          Emergency Department Notes       Roseann Oden, RN at 08/16/21 1049        Pt to ER via PV. Pt states tested positive for COVID over 10 days ago. Pt c/o generalized weakness.     Patient in mask. This RN in appropriate PPE throughout the patient's entire encounter.       Electronically signed by Roseann Oden, RN at 08/16/21 1050     Steph Herbert, RN at 08/16/21 1208        Pt reports testing positive for COVID on 8/11 but having symptoms since 8/9. Now states this week has been having generalized weakness and muscle aches. Had first dose of covid vaccine on 8/7. Pt a&ox4, abc's intact, NAD noted, ambulatory to triage.     Patient wearing mask during triage. RN wearing appropriate PPE during triage. Hand hygiene performed.        Steph Herbert, RN  08/16/21 1210      Electronically signed by Steph Herbert RN at 08/16/21 1210     Radha Linton, RN at 08/16/21 1344        This RN wearing all appropriate PPE during patient encounter. Hand hygiene performed before and during entering room.       Radha Linton RN  08/16/21 1344      Electronically signed by Radha Linton RN at 08/16/21 1344     Radha Linton RN at 08/16/21 1419        This RN wearing all appropriate PPE during patient encounter. Hand hygiene performed before and during entering room.       Radha Linton RN  08/16/21 1419      Electronically signed by Radha Linton RN at 08/16/21 1419     Radha Linton RN at 08/16/21 1438        This RN wearing all appropriate PPE during patient encounter. Hand hygiene performed before and during entering room.       Radha Linton RN  08/16/21 1438      Electronically signed by Radha Linton RN at 08/16/21 1438     Radha Linton RN at 08/16/21 1633        This RN wearing all appropriate PPE during patient encounter. Hand hygiene performed before and during entering room.       Radha Linton RN  08/16/21 1633      Electronically signed by  Radha Linton RN at 08/16/21 1633       {Outbreak/Travel/Exposure Documentation......;  Question Available Choices Patient Response   COVID-19 Outbreak Screen:  Do you currently have a new onset of the following symptoms?        Fever/Chills, Cough, Shortness of air, Loss of taste or smell, No, Unknown  (!) Fever/Chills (08/16/21 1050)   COVID-19 Outbreak Screen: In the last 14 days, have you had contact with anyone who is ill, has show any of the symptoms listed above and/or has been diagnosis with the 2019 Novel Coronavirus? This includes any immediate household members but excludes any patients with whom you have been in contact within your normal work duties wearing proper PPE, if you are a healthcare worker.  Yes, No, Unknown              No (08/16/21 1050)   COVID-19 Outbreak Screen: Who was notified? Free text (not recorded)   Ebola Screening Outbreak Screen: Have you traveled to the [place] or [place] within the past 21 days?  Yes, No, Unknown (not recorded)   Ebola Screening Outbreak Screen: Do you have ANY of the following symptoms: Fever/Chills, Vomiting, Diarrhea, Fatigue, Headache, Muscle pain, Unexplained bleeding, Abdominal (stomach) pain, No, Unknown (not recorded)   Ebola Screening Outbreak Screen: Name of Person notified Free text (not recorded)   Travel Screen: Have you traveled in the last month? If so, to what country have you traveled? If [place] what state? Yes, No, Unknown  List of all countries  List of all States No (08/16/21 1050)  (not recorded)  (not recorded)   Infection Risk: Do you currently have the following symptoms?  (If cough is selected, the Tuberculosis Screen is performed.) Cough, Fever, Rash, No No (08/16/21 1050)   Tuberculosis Screen: Do you have any of the following Tuberculosis Risks?  · Have you lived or spent time with anyone who had or may have TB?  · Have you lived in or visited any of the following areas for more than one month: [place],  [place], [place], [place] or [place], the [place] or [place]?  · Do you have HIV/AIDS?  · Have you lived in or worked in a nursing home, homeless shelter, correctional facility, or substance abuse treatment facility?   · No    If Yes do you have any of the following symptoms? Yes responses display to the right    If Yes, symptoms listed are:  Cough greater than or equal to 3 weeks, Loss of appetite, Unexplained weight loss, Night sweats, Bloody sputum or hemoptysis, Hoarseness, Fever, Fatigue, Chest pain, No (not recorded)  (not recorded)   Exposure Screen: Have you been exposed to any of these contagious diseases in the last month? Measles, Chickenpox, Meningitis, Pertussis, Whooping Cough, No No (08/16/21 1050)     Oxygen Therapy (since admission)     Date/Time   SpO2   Device (Oxygen Therapy)   Flow (L/min)   Oxygen Concentration (%)   ETCO2 (mmHg)    08/17/21 0700   --   nasal cannula   4   --   --    08/17/21 0500   97   --   --   --   --    08/17/21 0400   95   --   --   --   --    08/17/21 0200   96   --   --   --   --    08/17/21 0000   96   --   --   --   --    08/16/21 2310   --   nasal cannula   4   --   --    08/16/21 2307   --   nasal cannula   3   --   --    08/16/21 2306   91   nasal cannula   2   --   --    08/16/21 2006   --   room air   --   --   --    08/16/21 1909   92   room air   --   --   --    08/16/21 1834   --   room air   --   --   --    08/16/21 1811   94   room air   --   --   --    08/16/21 1711   95   --   --   --   --    08/16/21 1701   92   --   --   --   --    08/16/21 1531   95   --   --   --   --    08/16/21 1501   93   --   --   --   --    08/16/21 1431   94   --   --   --   --    08/16/21 1428   96   --   --   --   --    08/16/21 1401   95   --   --   --   --    08/16/21 1334   95   --   --   --   --    08/16/21 13:32:35   94   room air   --   --   --    08/16/21 1050   95   room air   --   --   --            Intake & Output (last 2 days)       08/15 0701 - 08/16 0700  08/16 0701 - 08/17 0700 08/17 0701 - 08/18 0700    P.O.   120    I.V. (mL/kg)  300 (3)     IV Piggyback  1050     Total Intake(mL/kg)  1350 (13.7) 120 (1.2)    Urine (mL/kg/hr)  300     Total Output  300     Net  +1050 +120           Urine Unmeasured Occurrence  2 x         Lines, Drains & Airways    Active LDAs     Name:   Placement date:   Placement time:   Site:   Days:    Peripheral IV 08/16/21 1339 Right Antecubital   08/16/21    1339    Antecubital   less than 1    Peripheral IV 08/16/21 1412 Left Antecubital   08/16/21    1412    Antecubital   less than 1         Inactive LDAs     None                  Medication Administration Report for Jeronimo Bain as of 08/17/21 0959    Legend:    Given Hold Not Given Due Canceled Entry Other Actions    Time Time (Time) Time  Time-Action       Discontinued     Completed     Future     MAR Hold     Linked           Medications 08/15/21 08/16/21 08/17/21    acetaminophen (TYLENOL) tablet 650 mg  Dose: 650 mg  Freq: Every 6 Hours PRN Route: PO  PRN Reason: Mild Pain   Start: 08/16/21 1643    Admin Instructions:   Do not exceed 4 grams of acetaminophen in a 24 hr period. Max dose of 2gm for AST/ALT greater than 120 units/L      If given for pain, use the following pain scale:   Mild Pain = Pain Score of 1-3, CPOT 1-2  Moderate Pain = Pain Score of 4-6, CPOT 3-4  Severe Pain = Pain Score of 7-10, CPOT 5-8      1826              albuterol (PROVENTIL) nebulizer solution 0.083% 2.5 mg/3mL  Dose: 2.5 mg  Freq: Every 6 Hours PRN Route: NEBULIZATION  PRN Reason: Shortness of Air  Start: 08/16/21 1634          ascorbic acid (VITAMIN C) tablet 500 mg  Dose: 500 mg  Freq: Daily Route: PO  Start: 08/16/21 1635 2007 0912             AZITHROMYCIN 500 MG/250 ML 0.9% NS IVPB (vial-mate)  Dose: 500 mg  Freq: Every 24 Hours Route: IV  Indications of Use: PNEUMONIA  Start: 08/16/21 1644   End: 08/21/21 1644 2008 1645             budesonide-formoterol (SYMBICORT)  160-4.5 MCG/ACT inhaler 2 puff  Dose: 2 puff  Freq: 2 Times Daily - RT Route: IN  Start: 08/16/21 2130    Admin Instructions:   Shake well. Rinse mouth after use, do not swallow water. Send aerosols to pharmacy in ziplock bag for proper disposal.      [phone]            cetirizine (zyrTEC) tablet 10 mg  Dose: 10 mg  Freq: Daily Route: PO  Start: 08/16/21 1644 2006 0912             cholecalciferol (VITAMIN D3) tablet 1,000 Units  Dose: 1,000 Units  Freq: Daily Route: PO  Start: 08/16/21 1635 2006 0913             dexamethasone sodium phosphate injection 6 mg  Dose: 6 mg  Freq: Daily Route: IV  Start: 08/17/21 0900    Admin Instructions:   For IV administration. May be pushed over a minimum of 1 minute.       0913             enoxaparin (LOVENOX) syringe 40 mg  Dose: 40 mg  Freq: Every 24 Hours Route: SC  Indications of Use: PROPHYLAXIS OF VENOUS THROMBOEMBOLISM  Start: 08/16/21 1634    Admin Instructions:   Give subcutaneous in abdomen only. Do not massage site after injection.      2007 1634             famotidine (PEPCID) tablet 20 mg  Dose: 20 mg  Freq: 2 Times Daily Route: PO  Start: 08/16/21 2100 2006 0912 2100            guaiFENesin (MUCINEX) 12 hr tablet 600 mg  Dose: 600 mg  Freq: Every 12 Hours Scheduled Route: PO  Start: 08/16/21 2100    Admin Instructions:   Caution: Look alike/sound alike drug alert               Do not crush, split, or chew.      2007 0912 2100            nitroglycerin (NITROSTAT) SL tablet 0.4 mg  Dose: 0.4 mg  Freq: Every 5 Minutes PRN Route: SL  PRN Reason: Chest Pain  PRN Comment: Only if SBP Greater Than 100  Start: 08/16/21 1717    Admin Instructions:   If Pain Unrelieved After 3 Doses Notify MD           Pharmacy Consult - Remdesivir  Freq: Continuous PRN Route: XX  PRN Reason: Consult  Start: 08/16/21 2302   End: 08/21/21 2301          sodium chloride 0.9 % flush 10 mL  Dose: 10 mL  Freq: As  Needed Route: IV  PRN Reason: Line Care  Start: 08/16/21 1243          sodium chloride 0.9 % with KCl 20 mEq/L infusion  Rate: 75 mL/hr Dose: 75 mL/hr  Freq: Continuous Route: IV  Start: 08/16/21 1633     2007              zinc sulfate (ZINCATE) capsule 220 mg  Dose: 220 mg  Freq: Daily Route: PO  Start: 08/16/21 1635     2007          1200            Future Medications  Medications 08/15/21 08/16/21 08/17/21       cefTRIAXone (ROCEPHIN) IVPB 2 g  Dose: 2 g  Freq: Every 24 Hours Route: IV  Indications of Use: PNEUMONIA  Start: 08/17/21 1500   End: 08/22/21 1459    Admin Instructions:   Caution: Look alike/sound alike drug alert. Refrigerate       1500             remdesivir 200 mg in sodium chloride 0.9 % 290 mL IVPB (powder vial)  Dose: 200 mg  Freq: Every 24 Hours Route: IV  Start: 08/17/21 0000   End: 08/17/21 0139    Admin Instructions:   Ensure all of the drug is administered. Flush line with 30mL NS after administration.       0039            Followed by  remdesivir 100 mg in sodium chloride 0.9 % 270 mL IVPB (powder vial)  Dose: 100 mg  Freq: Every 24 Hours Route: IV  Start: 08/17/21 2100   End: 08/21/21 2059    Admin Instructions:   Ensure all of the drug is administered. Flush line with 30mL NS after administration.       2100            Completed Medications  Medications 08/15/21 08/16/21 08/17/21       cefTRIAXone (ROCEPHIN) IVPB 2 g  Dose: 2 g  Freq: Once Route: IV  Indications of Use: PNEUMONIA  Start: 08/16/21 1509   End: 08/16/21 1631    Admin Instructions:   Caution: Look alike/sound alike drug alert. Refrigerate      1531     1631             iopamidol (ISOVUE-370) 76 % injection 100 mL  Dose: 100 mL  Freq: Once in Imaging Route: IV  Start: 08/16/21 1645   End: 08/16/21 1643     1643              sodium chloride 0.9 % bolus 1,000 mL  Dose: 1,000 mL  Freq: Once Route: IV  Start: 08/16/21 1246   End: 08/16/21 1631     1342     1631            Discontinued Medications  Medications 08/15/21 08/16/21  08/17/21       cefTRIAXone (ROCEPHIN) IVPB 2 g  Dose: 2 g  Freq: Every 24 Hours Route: IV  Indications of Use: PNEUMONIA  Start: 08/16/21 1644   End: 08/16/21 2127    Admin Instructions:   Caution: Look alike/sound alike drug alert. Refrigerate                    iopamidol (ISOVUE-370) 76 % injection 100 mL  Dose: 100 mL  Freq: Once in Imaging Route: IV  Start: 08/16/21 1646   End: 08/16/21 1644          sodium chloride 0.9 % bolus 1,000 mL  Dose: 1,000 mL  Freq: Once Route: IV  Start: 08/16/21 1644   End: 08/16/21 1644     (1826) [C]              sodium chloride 0.9 % infusion  Rate: 125 mL/hr Dose: 125 mL/hr  Freq: Continuous Route: IV  Start: 08/16/21 1246   End: 08/16/21 1631     1417     1632                      Lab Results (all)     Procedure Component Value Units Date/Time    Hemoglobin A1c [[id number]]  (Normal) Collected: 08/17/21 0901    Specimen: Blood Updated: 08/17/21 0922     Hemoglobin A1C 4.90 %     Narrative:      Manual Differential [[id number]] Collected: 08/17/21 0901    Specimen: Blood Updated: 08/17/21 0913    Comprehensive Metabolic Panel [[id number]] Collected: 08/17/21 0901    Specimen: Blood Updated: 08/17/21 0908    TSH [[id number]] Collected: 08/17/21 0901    Specimen: Blood Updated: 08/17/21 0908    Lipid Panel [[id number]] Collected: 08/17/21 0901    Specimen: Blood Updated: 08/17/21 0908    Ferritin [[id number]] Collected: 08/17/21 0901    Specimen: Blood Updated: 08/17/21 0908    C-reactive Protein [[id number]] Collected: 08/17/21 0901    Specimen: Blood Updated: 08/17/21 0908    Bilirubin, Direct [[id number]] Collected: 08/17/21 0901    Specimen: Blood Updated: 08/17/21 0908    D-dimer, Quantitative [[id number]] Collected: 08/17/21 0901    Specimen: Blood Updated: 08/17/21 0908    CBC & Differential [[id number]] Collected: 08/17/21 0901    Specimen: Blood Updated: 08/17/21 0907    Narrative:      CBC Auto Differential [[id number]] Collected: 08/17/21 0901    Specimen: Blood Updated: 08/17/21  0907    Hepatitis Panel, Acute [[id number]]  (Normal) Collected: 08/16/21 2049    Specimen: Blood Updated: 08/16/21 2211     Hepatitis B Surface Ag Non-Reactive     Hep A IgM Non-Reactive     Hep B C IgM Non-Reactive     Hepatitis C Ab Non-Reactive    Narrative:      Results may be falsely decreased if patient taking Biotin.     Legionella Antigen, Urine - Urine, Urine, Clean Catch [[id number]]  (Normal) Collected: 08/16/21 1437    Specimen: Urine, Clean Catch Updated: 08/16/21 2148     LEGIONELLA ANTIGEN, URINE Negative    S. Pneumo Ag Urine or CSF - Urine, Urine, Clean Catch [[id number]]  (Normal) Collected: 08/16/21 1437    Specimen: Urine, Clean Catch Updated: 08/16/21 2148     Strep Pneumo Ag Negative    Respiratory Panel PCR w/COVID-19(SARS-CoV-2) [facility] In-House, NP Swab in UTM/VTM, 3-4 HR TAT - Swab, Nasopharynx [[id number]]  (Abnormal) Collected: 08/16/21 1537    Specimen: Swab from Nasopharynx Updated: 08/16/21 1747     ADENOVIRUS, PCR Not Detected     Coronavirus 229E Not Detected     Coronavirus HKU1 Not Detected     Coronavirus NL63 Not Detected     Coronavirus OC43 Not Detected     COVID19 Detected     Human Metapneumovirus Not Detected     Human Rhinovirus/Enterovirus Not Detected     Influenza A PCR Not Detected     Influenza B PCR Not Detected     Parainfluenza Virus 1 Not Detected     Parainfluenza Virus 2 Not Detected     Parainfluenza Virus 3 Not Detected     Parainfluenza Virus 4 Not Detected     RSV, PCR Not Detected     Bordetella pertussis pcr Not Detected     Bordetella parapertussis PCR Not Detected     Chlamydophila pneumoniae PCR Not Detected     Mycoplasma pneumo by PCR Not Detected    Narrative:      Blood Culture - Blood, Arm, Left [[id number]] Collected: 08/16/21 1530    Specimen: Blood from Arm, Left Updated: 08/16/21 1542    Procalcitonin [[id number]]  (Abnormal) Collected: 08/16/21 1340    Specimen: Blood Updated: 08/16/21 1501     Procalcitonin 0.42 ng/mL      Narrative:      Troponin [[id number]]  (Normal) Collected: 08/16/21 1340    Specimen: Blood Updated: 08/16/21 1501     Troponin T <0.010 ng/mL     Narrative:      BNP [[id number]]  (Normal) Collected: 08/16/21 1340    Specimen: Blood Updated: 08/16/21 1501     proBNP 210.2 pg/mL     Narrative:      Manual Differential [[id number]]  (Abnormal) Collected: 08/16/21 1340    Specimen: Blood Updated: 08/16/21 1501     Neutrophil % 82.8 %      Lymphocyte % 10.1 %      Monocyte % 7.1 %      Neutrophils Absolute 3.63 10*3/mm3      Lymphocytes Absolute 0.44 10*3/mm3      Monocytes Absolute 0.31 10*3/mm3      RBC Morphology Normal     Smudge Cells Slight/1+     Platelet Morphology Normal    CBC & Differential [[id number]]  (Normal) Collected: 08/16/21 1340    Specimen: Blood Updated: 08/16/21 1500    Narrative:      CBC Auto Differential [[id number]]  (Normal) Collected: 08/16/21 1340    Specimen: Blood Updated: 08/16/21 1500     WBC 4.38 10*3/mm3      RBC 4.54 10*6/mm3      Hemoglobin 13.7 g/dL      Hematocrit 39.3 %      MCV 86.6 fL      MCH 30.2 pg      MCHC 34.9 g/dL      RDW 12.6 %      RDW-SD 39.7 fl      MPV 10.5 fL      Platelets 178 10*3/mm3     Comprehensive Metabolic Panel [[id number]]  (Abnormal) Collected: 08/16/21 1340    Specimen: Blood Updated: 08/16/21 1456     Glucose 97 mg/dL      BUN 7 mg/dL      Creatinine 0.87 mg/dL      Sodium 125 mmol/L      Potassium 3.6 mmol/L      Chloride 92 mmol/L      CO2 22.8 mmol/L      Calcium 8.1 mg/dL      Total Protein 5.7 g/dL      Albumin 3.80 g/dL      ALT (SGPT) 143 U/L      AST (SGOT) 203 U/L      Alkaline Phosphatase 99 U/L      Total Bilirubin 0.5 mg/dL      eGFR Non African Amer 90 mL/min/1.73      Globulin 1.9 gm/dL      A/G Ratio 2.0 g/dL      BUN/Creatinine Ratio 8.0     Anion Gap 10.2 mmol/L     Narrative:      C-reactive Protein [[id number]]  (Abnormal) Collected: 08/16/21 1340    Specimen: Blood Updated: 08/16/21 1456     C-Reactive Protein 7.08 mg/dL     Magnesium  [[id number]]  (Normal) Collected: 08/16/21 1340    Specimen: Blood Updated: 08/16/21 1456     Magnesium 2.1 mg/dL     Urinalysis, Microscopic Only - Urine, Clean Catch [[id number]]  (Abnormal) Collected: 08/16/21 1437    Specimen: Urine, Clean Catch Updated: 08/16/21 1456     RBC, UA 0-2 /HPF      WBC, UA 3-5 /HPF      Bacteria, UA None Seen /HPF      Squamous Epithelial Cells, UA 0-2 /HPF      Hyaline Casts, UA 0-2 /LPF      Methodology Automated Microscopy    Urinalysis With Microscopic If Indicated (No Culture) - Urine, Clean Catch [[id number]]  (Abnormal) Collected: 08/16/21 1437    Specimen: Urine, Clean Catch Updated: 08/16/21 1455     Color, UA Dark Yellow     Appearance, UA Clear     pH, UA 6.5     Specific Gravity, UA 1.021     Glucose, UA Negative     Ketones, UA Trace     Bilirubin, UA Negative     Blood, UA Negative     Protein, UA 30 mg/dL (1+)     Leuk Esterase, UA Negative     Nitrite, UA Negative     Urobilinogen, UA 2.0 E.U./dL    Lactic Acid, Plasma [[id number]]  (Normal) Collected: 08/16/21 1411    Specimen: Blood Updated: 08/16/21 1448     Lactate 1.0 mmol/L     Protime-INR [[id number]]  (Normal) Collected: 08/16/21 1340    Specimen: Blood Updated: 08/16/21 1442     Protime 12.4 Seconds      INR 0.94    D-dimer, Quantitative [[id number]]  (Abnormal) Collected: 08/16/21 1340    Specimen: Blood Updated: 08/16/21 1442     D-Dimer, Quantitative 0.64 MCGFEU/mL     Narrative:      The Stago D-Dimer test used in conjunction with a clinical pretest probability (PTP) assessment model, has been approved by the FDA to rule out the presence of venous thromboembolism (VTE) in outpatients suspected of deep venous thrombosis (DVT) or pulmonary embolism (PE). The cut-off for negative predictive value is <0.50 MCGFEU/mL.    Blood Culture - Blood, Arm, Right [[id number]] Collected: 08/16/21 1343    Specimen: Blood from Arm, Right Updated: 08/16/21 1425    Blood Culture - Blood, Arm, Left [[id number]] Collected: 08/16/21  1411    Specimen: Blood from Arm, Left Updated: 08/16/21 1425          Imaging Results (All)     Procedure Component Value Units Date/Time    CT Angiogram Chest [[id number]] Collected: 08/16/21 1712     Updated: 08/17/21 0712    Narrative:      CT ANGIOGRAM OF THE CHEST. MULTIPLE CORONAL, SAGITTAL, AND 3-D  RECONSTRUCTIONS.     HISTORY: 56-year-old male with shortness of breath. Evaluate for  pulmonary thromboemboli.     TECHNIQUE: Radiation dose reduction techniques were utilized, including  automated exposure control and exposure modulation based on body size.   CT angiogram of the chest was performed following the administration of  IV contrast. Multiple coronal, sagittal, and 3-D reconstruction images  were obtained. There is no previous CT for comparison.     FINDINGS: There is adequate opacification of the pulmonary arteries and  there is no convincing evidence for pulmonary thromboemboli. There are  patchy and ill-defined ground-glass opacities throughout both lung  fields, both centrally and peripherally. There is moderately advanced  emphysematous change. There are no dense consolidations and there are no  pleural or pericardial effusions. There are shotty mediastinal nodes and  mildly enlarged hilar nodes. A small right renal cyst is noted.       Impression:      1. There is no evidence for pulmonary thromboemboli.  2. Central and peripheral patchy and diffuse ground-glass opacities  within both lung fields likely represents atypical pneumonia. The mildly  enlarged hilar nodes are likely reactive. Reevaluation is recommended  with a contrast enhanced chest CT in 3 months.     Discussed with Dr. Short.     This report was finalized on 8/17/2021 7:09 AM by Dr. Natalia Smith M.D.       XR Chest 1 View [[id number]] Collected: 08/16/21 1349     Updated: 08/16/21 1501    Narrative:      ONE VIEW PORTABLE CHEST     HISTORY: Positive COVID-19 infection. Weakness.     There is some faint consolidation at the periphery  of both lungs highly  suspicious for changes of COVID-19 pneumonia and continued follow-up  evaluation may be helpful. The heart is borderline enlarged.     This report was finalized on 8/16/2021 2:58 PM by Dr. Sumit Raza M.D.             ECG/EMG Results (all)     None        Orders (all)      Start     Ordered    08/17/21 0000  remdesivir 200 mg in sodium chloride 0.9 % 290 mL IVPB (powder vial)  Every 24 Hours      08/16/21 2303    08/16/21 1809  Inpatient Case Management  Consult  Once     Provider:  (Not yet assigned)    08/16/21 1808    08/16/21 1718  Telemetry - Maintain IV Access  Continuous      08/16/21 1717    08/16/21 1718  Continuous Cardiac Monitoring  Continuous      08/16/21 1717 08/16/21 1718  May Be Off Telemetry for Tests  Continuous      08/16/21 1717    08/16/21 1718  ACLS Protocol For Life Threatening Dysrhythmias (Unless Code Status Indicates Otherwise)  Continuous      08/16/21 1717    08/16/21 1718  Notify Provider if ACLS Protocol Activated  Until Discontinued      08/16/21 1717    08/16/21 1717  Oxygen Therapy- Nasal Cannula; Titrate for SPO2: 90% - 95%  Continuous PRN,   Status:  Canceled     Comments: If Patient Develops Unresponsiveness, Acute Dyspnea, Cyanosis or Suspected Hypoxemia Start Continuous Pulse Ox Monitoring, Apply Oxygen & Notify Provider    08/16/21 1717    08/16/21 1649  Place Sequential Compression Device  Once      08/16/21 1648    08/16/21 1649  Maintain Sequential Compression Device  Continuous      08/16/21 1648    08/16/21 1649  Code Status and Medical Interventions:  Continuous      08/16/21 1648    08/16/21 1649  Activity As Tolerated  Until Discontinued      08/16/21 1648    08/16/21 1646  Inpatient Infectious Diseases Consult  Once     Specialty:  Infectious Diseases  Provider:  Brennan Fajardo MD    08/16/21 1645    08/16/21 1635  Diet Regular  Diet Effective Now      08/16/21 1634    08/16/21 1537  Inpatient Admission  Once      08/16/21  1536    21 1507  Patient Isolation Enhanced Droplet / Contact  Continuous      21 1507    21 1507  Respiratory Panel PCR w/COVID-19(SARS-CoV-2) [facility]/SERGIO/[facility] In-House, NP Swab in UTM/VTM, 3-4 HR TAT - Swab, Nasopharynx  Once      21 1507    21 1456  LIPPS (on-call MD unless specified)  Once     Specialty:  Internal Medicine  Provider:  (Not yet assigned)    21 1455    21 1244  Cardiac Monitoring  Once,   Status:  Canceled      21 1244    21 1244  Pulse Oximetry, Continuous  Continuous      21 1244    Unscheduled  Telemetry - Pulse Oximetry  Continuous PRN     Comments: If Patient Develops Unresponsiveness, Acute Dyspnea, Cyanosis or Suspected Hypoxemia Start Continuous Pulse Ox Monitoring, Apply Oxygen & Notify Provider    21 1717    Unscheduled  Oxygen Therapy- Nasal Cannula; Titrate for SPO2: 94%, Greater Than or Equal To  Continuous PRN     Comments: If Patient Develops Unresponsiveness, Acute Dyspnea, Cyanosis or Suspected Hypoxemia Start Continuous Pulse Ox Monitoring, Apply Oxygen & Notify Provider    21 2303                     Consult Notes (all)      Brennan Fajardo MD at 21 205      Consult Orders    1. Inpatient Infectious Diseases Consult [[id number]] ordered by Jessee Alvarez MD at 21 1645               CONSULT NOTE    Infectious Diseases - Brennan Fajardo. MD  [facility]       Patient Identification:  Name: Jeronimo Bain  Age: 56 y.o.  Sex: male  :  1965  MRN: [id number]             Date of Consultation: 2021      Primary Care Physician: Edgar Sanchez MD                               Requesting Physician:   Reason for Consultation: COVID-19 infection.    Impression: Patient is a 56-year-old male who was tested positive for COVID-19 8 days ago and recently his grandmother was hospitalized with COVID-19 infection and passed away came to the emergency room for feeling  worse in terms of fever shortness of breath decreased appetite and not eating and drinking much.  Work-up in the emergency room revealed lung infiltrate and initially preserved oxygenation on room air.  Because of the elevated inflammatory markers patient was thought to have secondary bacterial pneumonia and associated hyponatremia.  Empiric antibiotic therapy for pneumonia initiated and infectious disease service is consulted.  During my evaluation patient dropped his oxygen saturation as low as 88% on room air and then he stays quite climbs back to about 90%.  This presentation in the above context is concerning for:  1-systemic COVID-19 infection with pneumonia and possible secondary bacterial process given the duration of his symptoms.  2-hypoxia episodically especially with activity and exertion makes him at high risk of progression of disease.  3-Hyponatremia likely multifactorial including SIADH due to lung infection and associated dehydration due to decreased intake.      Recommendations/Discussions:  At this juncture follow-up on blood cultures check urine for Legionella and pneumococcal antigen and continue treatment for bacterial pneumonia.  Given his presentation and risk for progression and episodic hypoxia that he is exhibiting with obvious respiratory distress that he is not with activity I think he is a candidate for oxygen supplementation, remdesivir and dexamethasone.  Monitor closely for progression of illness manifested as increasing oxygen requirement and progressive hypoxia as well as monitor Covid inflammation markers.  Thank you Dr. Bullock for letting me be the part of your patient care please see above impression and recommendations      History of Present Illness:   Patient is a 56-year-old male who was tested positive for COVID-19 8 days ago and recently his grandmother was hospitalized with COVID-19 infection and passed away came to the emergency room for feeling worse in terms of fever  shortness of breath decreased appetite and not eating and drinking much.  Work-up in the emergency room revealed lung infiltrate and initially preserved oxygenation on room air.  Because of the elevated inflammatory markers patient was thought to have secondary bacterial pneumonia and associated hyponatremia.  Empiric antibiotic therapy for pneumonia initiated and infectious disease service is consulted.  During my evaluation patient dropped his oxygen saturation as low as 88% on room air and then he stays quite climbs back to about 90%.  Apparently patient just received his first dose of COVID-19 vaccine a day before symptoms started.      Past Medical History:  History reviewed. No pertinent past medical history.  Past Surgical History:  History reviewed. No pertinent surgical history.   Home Meds:  No medications prior to admission.     Current Meds:     Current Facility-Administered Medications:   •  acetaminophen (TYLENOL) tablet 650 mg, 650 mg, Oral, Q6H PRN, Jessee Alvarez MD, 650 mg at 08/16/21 1826  •  albuterol (PROVENTIL) nebulizer solution 0.083% 2.5 mg/3mL, 2.5 mg, Nebulization, Q6H PRN, Jessee Alvarez MD  •  ascorbic acid (VITAMIN C) tablet 500 mg, 500 mg, Oral, Daily, Jessee Alvarez MD, 500 mg at 08/16/21 2007  •  AZITHROMYCIN 500 MG/250 ML 0.9% NS IVPB (vial-mate), 500 mg, Intravenous, Q24H, Jessee Alvarez MD, 500 mg at 08/16/21 2008  •  budesonide-formoterol (SYMBICORT) 160-4.5 MCG/ACT inhaler 2 puff, 2 puff, Inhalation, BID - RT, Jessee Alvarez MD  •  cefTRIAXone (ROCEPHIN) IVPB 2 g, 2 g, Intravenous, Q24H, Jessee Alvarez MD  •  cetirizine (zyrTEC) tablet 10 mg, 10 mg, Oral, Daily, Jessee Alvarez MD, 10 mg at 08/16/21 2006  •  cholecalciferol (VITAMIN D3) tablet 1,000 Units, 1,000 Units, Oral, Daily, Jessee Alvarez MD, 1,000 Units at 08/16/21 2006  •  enoxaparin (LOVENOX) syringe 40 mg, 40 mg, Subcutaneous, Q24H, Jessee Alvarez MD, 40 mg at 08/16/21 2007  •  famotidine (PEPCID) tablet 20 mg, 20 mg, Oral,  "BID, Jessee Alvarez MD, 20 mg at 21  •  guaiFENesin (MUCINEX) 12 hr tablet 600 mg, 600 mg, Oral, Q12H, Jessee Alvarez MD, 600 mg at 21  •  nitroglycerin (NITROSTAT) SL tablet 0.4 mg, 0.4 mg, Sublingual, Q5 Min PRN, Jessee Alvarez MD  •  [COMPLETED] Insert peripheral IV, , , Once **AND** sodium chloride 0.9 % flush 10 mL, 10 mL, Intravenous, PRN, Efra Prieto MD  •  sodium chloride 0.9 % with KCl 20 mEq/L infusion, 75 mL/hr, Intravenous, Continuous, Jessee Alvarez MD, Last Rate: 75 mL/hr at 21, 75 mL/hr at 21  •  zinc sulfate (ZINCATE) capsule 220 mg, 220 mg, Oral, Daily, Jessee Alvarez MD, 220 mg at 21  Allergies:  No Known Allergies  Social History:   Social History     Tobacco Use   • Smoking status: Former Smoker     Packs/day: 1.00     Types: Cigarettes     Quit date: 2007     Years since quittin.5   • Smokeless tobacco: Never Used   • Tobacco comment: \"I quit 14 years ago.\"   Substance Use Topics   • Alcohol use: Yes     Comment: \"once a month probably\"      Family History:  History reviewed. No pertinent family history.       Review of Systems  See history of present illness and past medical history.  As noted in the history of presenting illness.    Remainder of ROS is negative.      Vitals:   /80 (BP Location: Right arm, Patient Position: Lying)   Pulse 101   Temp (!) 100.9 °F (38.3 °C) (Oral)   Resp 18   Ht 175.3 cm (69\")   Wt 98.9 kg (218 lb)   SpO2 92%   BMI 32.19 kg/m²   I/O:     Intake/Output Summary (Last 24 hours) at 2021  Last data filed at 2021 1632  Gross per 24 hour   Intake 1350 ml   Output --   Net 1350 ml     Exam:  Patient is examined using the personal protective equipment as per guidelines from infection control for this particular patient as enacted.  Hand washing was performed before and after patient interaction.  General Appearance:   Awake interactive male who appears to be distressed.   Head:    " Normocephalic, without obvious abnormality, atraumatic   Eyes:    PERRL, conjunctivae/corneas clear, EOM's intact, both eyes   Ears:    Normal external ear canals, both ears   Nose:   Nares normal, septum midline, mucosa normal, no drainage    or sinus tenderness   Throat:   Lips, tongue, gums normal; oral mucosa pink and moist   Neck:   Supple, symmetrical, trachea midline, no adenopathy;     thyroid:  no enlargement/tenderness/nodules; no carotid    bruit or JVD   Back:     Symmetric, no curvature, ROM normal, no CVA tenderness   Lungs:    Bibasilar crackles.   Chest Wall:    No tenderness or deformity    Heart:    Regular rate and rhythm, S1 and S2 normal, no murmur, rub   or gallop   Abdomen:     Soft, non-tender, bowel sounds active all four quadrants,     no masses, no hepatomegaly, no splenomegaly   Extremities:   Extremities normal, atraumatic, no cyanosis or edema   Pulses:   Pulses palpable in all extremities; symmetric all extremities   Skin:   Skin color normal, Skin is warm and dry,  no rashes or palpable lesions   Neurologic:  Grossly nonfocal       Data Review:    I reviewed the patient's new clinical results.  Results from last 7 days   Lab Units 08/16/21  1340   WBC 10*3/mm3 4.38   HEMOGLOBIN g/dL 13.7   PLATELETS 10*3/mm3 178     Results from last 7 days   Lab Units 08/16/21  1340   SODIUM mmol/L 125*   POTASSIUM mmol/L 3.6   CHLORIDE mmol/L 92*   CO2 mmol/L 22.8   BUN mg/dL 7   CREATININE mg/dL 0.87   CALCIUM mg/dL 8.1*   GLUCOSE mg/dL 97     No results found for: CULTURE]  Microbiology Results (last 10 days)     Procedure Component Value - Date/Time    Respiratory Panel PCR w/COVID-19(SARS-CoV-2) [facility]/SERGIO/[facility] In-House, NP Swab in UTM/VTM, 3-4 HR TAT - Swab, Nasopharynx [[id number]]  (Abnormal) Collected: 08/16/21 1537    Lab Status: Final result Specimen: Swab from Nasopharynx Updated: 08/16/21 9507     ADENOVIRUS, PCR Not Detected     Coronavirus 229E Not Detected     Coronavirus  HKU1 Not Detected     Coronavirus NL63 Not Detected     Coronavirus OC43 Not Detected     COVID19 Detected     Human Metapneumovirus Not Detected     Human Rhinovirus/Enterovirus Not Detected     Influenza A PCR Not Detected     Influenza B PCR Not Detected     Parainfluenza Virus 1 Not Detected     Parainfluenza Virus 2 Not Detected     Parainfluenza Virus 3 Not Detected     Parainfluenza Virus 4 Not Detected     RSV, PCR Not Detected     Bordetella pertussis pcr Not Detected     Bordetella parapertussis PCR Not Detected     Chlamydophila pneumoniae PCR Not Detected     Mycoplasma pneumo by PCR Not Detected    Narrative:      In the setting of a positive respiratory panel with a viral infection PLUS a negative procalcitonin without other underlying concern for bacterial infection, consider observing off antibiotics or discontinuation of antibiotics and continue supportive care. If the respiratory panel is positive for atypical bacterial infection (Bordetella pertussis, Chlamydophila pneumoniae, or Mycoplasma pneumoniae), consider antibiotic de-escalation to target atypical bacterial infection.          Assessment:  Active Hospital Problems    Diagnosis  POA   • Pneumonia due to COVID-19 virus [U07.1, J12.82]  Yes      Resolved Hospital Problems   No resolved problems to display.         Plan:  See above  Brennan Fajardo MD   8/16/2021  20:57 EDT    Much of this encounter note is an electronic transcription/translation of spoken language to printed text. The electronic translation of spoken language may permit erroneous, or at times, nonsensical words or phrases to be inadvertently transcribed; Although I have reviewed the note for such errors, some may still exist      Electronically signed by Brennan Fajardo MD at 08/17/21 7028

## 2021-08-17 NOTE — PROGRESS NOTES
[facility]  Clinical Pharmacy Department     Remdesivir Review Note    Jeronimo Bain is a 56 y.o. male with confirmed COVID-19 infection on day 1 of hospitalization.     Consulting Provider:  Dr. Fajardo  Date of Confirmed SARS-CoV-2: 8/10/21  Date of Symptom Onset: 8/9/21  Planned Duration of Therapy: 5 days  Other Antimicrobials: Azithromycin 500mg IV q24H, Ceftriaxone 2gm IV q24H  Hydroxychloroquine or chloroquine prior to arrival:     Allergies  Allergies as of 08/16/2021    (No Known Allergies)       Microbiology:  Microbiology Results (last 10 days)       Procedure Component Value - Date/Time    Respiratory Panel PCR w/COVID-19(SARS-CoV-2) DANNY/ERICA/SERGIO/PAD/COR/MAD/EUGENE In-House, NP Swab in UTM/VTM, 3-4 HR TAT - Swab, Nasopharynx [[id number]]  (Abnormal) Collected: 08/16/21 1537    Lab Status: Final result Specimen: Swab from Nasopharynx Updated: 08/16/21 9594     ADENOVIRUS, PCR Not Detected     Coronavirus 229E Not Detected     Coronavirus HKU1 Not Detected     Coronavirus NL63 Not Detected     Coronavirus OC43 Not Detected     COVID19 Detected     Human Metapneumovirus Not Detected     Human Rhinovirus/Enterovirus Not Detected     Influenza A PCR Not Detected     Influenza B PCR Not Detected     Parainfluenza Virus 1 Not Detected     Parainfluenza Virus 2 Not Detected     Parainfluenza Virus 3 Not Detected     Parainfluenza Virus 4 Not Detected     RSV, PCR Not Detected     Bordetella pertussis pcr Not Detected     Bordetella parapertussis PCR Not Detected     Chlamydophila pneumoniae PCR Not Detected     Mycoplasma pneumo by PCR Not Detected    Narrative:      In the setting of a positive respiratory panel with a viral infection PLUS a negative procalcitonin without other underlying concern for bacterial infection, consider observing off antibiotics or discontinuation of antibiotics and continue supportive care. If the respiratory panel is positive for atypical bacterial infection (Bordetella pertussis,  Chlamydophila pneumoniae, or Mycoplasma pneumoniae), consider antibiotic de-escalation to target atypical bacterial infection.    S. Pneumo Ag Urine or CSF - Urine, Urine, Clean Catch [[id number]]  (Normal) Collected: 08/16/21 1437    Lab Status: Final result Specimen: Urine, Clean Catch Updated: 08/16/21 2148     Strep Pneumo Ag Negative    Legionella Antigen, Urine - Urine, Urine, Clean Catch [[id number]]  (Normal) Collected: 08/16/21 1437    Lab Status: Final result Specimen: Urine, Clean Catch Updated: 08/16/21 2148     LEGIONELLA ANTIGEN, URINE Negative              Vitals/Labs/I&O  [unfilled]    Results from last 7 days   Lab Units 08/16/21  1340   WBC 10*3/mm3 4.38     Results from last 7 days   Lab Units 08/16/21  1340   PROCALCITONIN ng/mL 0.42*     Results from last 7 days   Lab Units 08/16/21  1340   AST (SGOT) U/L 203*      Results from last 7 days   Lab Units 08/16/21  1340   ALT (SGPT) U/L 143*       Estimated Creatinine Clearance: 107.3 mL/min (by C-G formula based on SCr of 0.87 mg/dL).  Results from last 7 days   Lab Units 08/16/21  1340   BUN mg/dL 7   CREATININE mg/dL 0.87     Intake & Output (last 3 days)         08/14 0701 - 08/15 0700 08/15 0701 - 08/16 0700 08/16 0701 - 08/17 0700    I.V. (mL/kg)   300 (3)    IV Piggyback   1050    Total Intake(mL/kg)   1350 (13.7)    Net   +1350           Urine Unmeasured Occurrence   1 x            Assessment/Plan:    Patient is hospitalized with confirmed, severe COVID-19 infection and started on remdesivir 200 mg IV once followed by 100 mg IV daily for 4 days (5 day total duration). All inclusions, exclusions, and monitoring requirements listed below have been reviewed.    Patient is hospitalized with confirmed COVID-19 infection  Patient is requiring supplemental oxygen to maintain oxygen saturations of ? 94%   Baseline and daily LFTs and Scr have been ordered prior to remdesivir initiation  ALT is not ? 10 times the upper limit of normal  Patient is not on  concomitant hydroxychloroquine or chloroquine       Thank you for involving pharmacy in this patient's care. Please contact pharmacy with any questions or concerns.                           Hieu Saleh [facility]  Clinical Pharmacist  08/16/21 23:07 EDT

## 2021-08-17 NOTE — PLAN OF CARE
Goal Outcome Evaluation:  Plan of Care Reviewed With: patient        Progress: no change  Outcome Summary: VSS. pt requires O2 less than or at 94% per order. pt 95-97% on 4L. IVF and remdesivir started

## 2021-08-18 LAB
ALBUMIN SERPL-MCNC: 2.7 G/DL (ref 3.5–5.2)
ALBUMIN/GLOB SERPL: 1 G/DL
ALP SERPL-CCNC: 85 U/L (ref 39–117)
ALT SERPL W P-5'-P-CCNC: 238 U/L (ref 1–41)
ANION GAP SERPL CALCULATED.3IONS-SCNC: 11 MMOL/L (ref 5–15)
AST SERPL-CCNC: 243 U/L (ref 1–40)
BASOPHILS # BLD AUTO: 0.01 10*3/MM3 (ref 0–0.2)
BASOPHILS NFR BLD AUTO: 0.2 % (ref 0–1.5)
BILIRUB CONJ SERPL-MCNC: <0.2 MG/DL (ref 0–0.3)
BILIRUB SERPL-MCNC: 0.3 MG/DL (ref 0–1.2)
BUN SERPL-MCNC: 8 MG/DL (ref 6–20)
BUN/CREAT SERPL: 13.3 (ref 7–25)
CALCIUM SPEC-SCNC: 8 MG/DL (ref 8.6–10.5)
CHLORIDE SERPL-SCNC: 105 MMOL/L (ref 98–107)
CO2 SERPL-SCNC: 19 MMOL/L (ref 22–29)
CREAT SERPL-MCNC: 0.6 MG/DL (ref 0.76–1.27)
CRP SERPL-MCNC: 5.9 MG/DL (ref 0–0.5)
DEPRECATED RDW RBC AUTO: 41.8 FL (ref 37–54)
EOSINOPHIL # BLD AUTO: 0 10*3/MM3 (ref 0–0.4)
EOSINOPHIL NFR BLD AUTO: 0 % (ref 0.3–6.2)
ERYTHROCYTE [DISTWIDTH] IN BLOOD BY AUTOMATED COUNT: 13.1 % (ref 12.3–15.4)
GFR SERPL CREATININE-BSD FRML MDRD: 138 ML/MIN/1.73
GLOBULIN UR ELPH-MCNC: 2.8 GM/DL
GLUCOSE SERPL-MCNC: 91 MG/DL (ref 65–99)
HCT VFR BLD AUTO: 35.1 % (ref 37.5–51)
HGB BLD-MCNC: 11.9 G/DL (ref 13–17.7)
IMM GRANULOCYTES # BLD AUTO: 0.05 10*3/MM3 (ref 0–0.05)
IMM GRANULOCYTES NFR BLD AUTO: 1.2 % (ref 0–0.5)
LYMPHOCYTES # BLD AUTO: 0.96 10*3/MM3 (ref 0.7–3.1)
LYMPHOCYTES NFR BLD AUTO: 23.8 % (ref 19.6–45.3)
MCH RBC QN AUTO: 29.7 PG (ref 26.6–33)
MCHC RBC AUTO-ENTMCNC: 33.9 G/DL (ref 31.5–35.7)
MCV RBC AUTO: 87.5 FL (ref 79–97)
MONOCYTES # BLD AUTO: 0.34 10*3/MM3 (ref 0.1–0.9)
MONOCYTES NFR BLD AUTO: 8.4 % (ref 5–12)
NEUTROPHILS NFR BLD AUTO: 2.67 10*3/MM3 (ref 1.7–7)
NEUTROPHILS NFR BLD AUTO: 66.4 % (ref 42.7–76)
NRBC BLD AUTO-RTO: 0 /100 WBC (ref 0–0.2)
PLATELET # BLD AUTO: 226 10*3/MM3 (ref 140–450)
PMV BLD AUTO: 10.1 FL (ref 6–12)
POTASSIUM SERPL-SCNC: 3.9 MMOL/L (ref 3.5–5.2)
PROT SERPL-MCNC: 5.5 G/DL (ref 6–8.5)
RBC # BLD AUTO: 4.01 10*6/MM3 (ref 4.14–5.8)
SODIUM SERPL-SCNC: 135 MMOL/L (ref 136–145)
WBC # BLD AUTO: 4.03 10*3/MM3 (ref 3.4–10.8)

## 2021-08-18 PROCEDURE — 25010000002 AZITHROMYCIN PER 500 MG: Performed by: HOSPITALIST

## 2021-08-18 PROCEDURE — 85025 COMPLETE CBC W/AUTO DIFF WBC: CPT | Performed by: HOSPITALIST

## 2021-08-18 PROCEDURE — 25010000003 CEFTRIAXONE PER 250 MG: Performed by: INTERNAL MEDICINE

## 2021-08-18 PROCEDURE — 94799 UNLISTED PULMONARY SVC/PX: CPT

## 2021-08-18 PROCEDURE — 99232 SBSQ HOSP IP/OBS MODERATE 35: CPT | Performed by: INTERNAL MEDICINE

## 2021-08-18 PROCEDURE — 82248 BILIRUBIN DIRECT: CPT | Performed by: INTERNAL MEDICINE

## 2021-08-18 PROCEDURE — 80053 COMPREHEN METABOLIC PANEL: CPT | Performed by: HOSPITALIST

## 2021-08-18 PROCEDURE — 86140 C-REACTIVE PROTEIN: CPT | Performed by: HOSPITALIST

## 2021-08-18 PROCEDURE — 25010000002 DEXAMETHASONE SODIUM PHOSPHATE 10 MG/ML SOLUTION: Performed by: INTERNAL MEDICINE

## 2021-08-18 PROCEDURE — 94760 N-INVAS EAR/PLS OXIMETRY 1: CPT

## 2021-08-18 PROCEDURE — 25010000002 SODIUM CHLORIDE 0.9 % WITH KCL 20 MEQ 20-0.9 MEQ/L-% SOLUTION: Performed by: HOSPITALIST

## 2021-08-18 PROCEDURE — 25010000002 ENOXAPARIN PER 10 MG: Performed by: HOSPITALIST

## 2021-08-18 RX ADMIN — GUAIFENESIN 600 MG: 600 TABLET, EXTENDED RELEASE ORAL at 20:54

## 2021-08-18 RX ADMIN — REMDESIVIR 100 MG: 100 INJECTION, POWDER, LYOPHILIZED, FOR SOLUTION INTRAVENOUS at 20:54

## 2021-08-18 RX ADMIN — CETIRIZINE HYDROCHLORIDE 10 MG: 10 TABLET ORAL at 09:16

## 2021-08-18 RX ADMIN — GUAIFENESIN 600 MG: 600 TABLET, EXTENDED RELEASE ORAL at 09:17

## 2021-08-18 RX ADMIN — BUDESONIDE AND FORMOTEROL FUMARATE DIHYDRATE 2 PUFF: 160; 4.5 AEROSOL RESPIRATORY (INHALATION) at 19:20

## 2021-08-18 RX ADMIN — FAMOTIDINE 20 MG: 20 TABLET, FILM COATED ORAL at 09:17

## 2021-08-18 RX ADMIN — CEFTRIAXONE SODIUM 2 G: 2 INJECTION, SOLUTION INTRAVENOUS at 15:08

## 2021-08-18 RX ADMIN — Medication 220 MG: at 09:16

## 2021-08-18 RX ADMIN — DEXAMETHASONE SODIUM PHOSPHATE 6 MG: 10 INJECTION, SOLUTION INTRAMUSCULAR; INTRAVENOUS at 09:16

## 2021-08-18 RX ADMIN — FAMOTIDINE 20 MG: 20 TABLET, FILM COATED ORAL at 20:54

## 2021-08-18 RX ADMIN — BUDESONIDE AND FORMOTEROL FUMARATE DIHYDRATE 2 PUFF: 160; 4.5 AEROSOL RESPIRATORY (INHALATION) at 07:42

## 2021-08-18 RX ADMIN — ENOXAPARIN SODIUM 40 MG: 40 INJECTION SUBCUTANEOUS at 16:46

## 2021-08-18 RX ADMIN — AZITHROMYCIN 500 MG: 500 INJECTION, POWDER, LYOPHILIZED, FOR SOLUTION INTRAVENOUS at 16:47

## 2021-08-18 RX ADMIN — Medication 1000 UNITS: at 09:16

## 2021-08-18 RX ADMIN — POTASSIUM CHLORIDE AND SODIUM CHLORIDE 75 ML/HR: 900; 150 INJECTION, SOLUTION INTRAVENOUS at 05:18

## 2021-08-18 RX ADMIN — OXYCODONE HYDROCHLORIDE AND ACETAMINOPHEN 500 MG: 500 TABLET ORAL at 09:16

## 2021-08-18 NOTE — PROGRESS NOTES
"Daily progress note    Chief complaint  Doing better  Still with cough and shortness of breath  No new complaints  Denies any fever chills and body aches    History of present illness  56-year-old white male with no medical problem home medication who is an ex-smoker and drinks socially who has received 1 dose of COVID-19 vaccine earlier this month presented to [facility] emergency room with shortness of breath nonproductive cough congestion fatigue tired and developed fever today.  Patient denies any loss of taste and smell but has no appetite.  Patient has been tested positive for Covid 6 days ago.  Patient work-up in ER revealed COVID-19 pneumonia and possible bacterial pneumonia admit for management.  Patient also found to have hyponatremia and elevated liver enzymes with severe dehydration      REVIEW OF SYSTEMS  Unremarkable except cough and shortness of breath     PHYSICAL EXAM   Blood pressure 114/92, pulse 87, temperature 97.1 °F (36.2 °C), temperature source Oral, resp. rate 18, height 175.3 cm (69\"), weight 98.9 kg (218 lb), SpO2 96 %.    GENERAL: Male that appears weak and tired.  No acute distress.  HENT: nares patent  Head/neck/ face are symmetric without gross deformity, signs of trauma, or swelling  EYES: no scleral icterus, no conjunctival pallor.  NECK: Supple, no meningismus  CV: regular rhythm, regular rate with intact distal pulses.  No murmur or rub  RESPIRATORY: normal effort and no respiratory distress.  Rhonchi in the base of his lungs bilaterally on auscultation.  ABDOMEN: soft and nontender.  Obese  MUSCULOSKELETAL: no deformity.  No edema.  Intact distal pulses to upper and lower extremities that are equal strong and symmetric.  NEURO: alert and appropriate, moves all extremities, follows commands.  No focal motor or sensory changes.  SKIN: warm, dry     LAB RESULTS  Lab Results (last 24 hours)     Procedure Component Value Units Date/Time    Blood Culture - Blood, Arm, Right " [[id number]] Collected: 08/16/21 1343    Specimen: Blood from Arm, Right Updated: 08/18/21 1430     Blood Culture No growth at 2 days    Blood Culture - Blood, Arm, Left [[id number]] Collected: 08/16/21 1411    Specimen: Blood from Arm, Left Updated: 08/18/21 1430     Blood Culture No growth at 2 days    Comprehensive Metabolic Panel [[id number]]  (Abnormal) Collected: 08/18/21 0321    Specimen: Blood Updated: 08/18/21 0516     Glucose 91 mg/dL      BUN 8 mg/dL      Creatinine 0.60 mg/dL      Sodium 135 mmol/L      Potassium 3.9 mmol/L      Chloride 105 mmol/L      CO2 19.0 mmol/L      Calcium 8.0 mg/dL      Total Protein 5.5 g/dL      Albumin 2.70 g/dL      ALT (SGPT) 238 U/L      AST (SGOT) 243 U/L      Alkaline Phosphatase 85 U/L      Total Bilirubin 0.3 mg/dL      eGFR Non African Amer 138 mL/min/1.73      Globulin 2.8 gm/dL      A/G Ratio 1.0 g/dL      BUN/Creatinine Ratio 13.3     Anion Gap 11.0 mmol/L     Narrative:      GFR Normal >60  Chronic Kidney Disease <60  Kidney Failure <15      Bilirubin, Direct [[id number]]  (Normal) Collected: 08/18/21 0321    Specimen: Blood Updated: 08/18/21 0515     Bilirubin, Direct <0.2 mg/dL     C-reactive Protein [[id number]]  (Abnormal) Collected: 08/18/21 0321    Specimen: Blood Updated: 08/18/21 0515     C-Reactive Protein 5.90 mg/dL     CBC & Differential [[id number]]  (Abnormal) Collected: 08/18/21 0321    Specimen: Blood Updated: 08/18/21 0449    Narrative:      The following orders were created for panel order CBC & Differential.  Procedure                               Abnormality         Status                     ---------                               -----------         ------                     CBC Auto Differential[[id number]]        Abnormal            Final result                 Please view results for these tests on the individual orders.    CBC Auto Differential [[id number]]  (Abnormal) Collected: 08/18/21 0321    Specimen: Blood Updated: 08/18/21 0449     WBC  4.03 10*3/mm3      RBC 4.01 10*6/mm3      Hemoglobin 11.9 g/dL      Hematocrit 35.1 %      MCV 87.5 fL      MCH 29.7 pg      MCHC 33.9 g/dL      RDW 13.1 %      RDW-SD 41.8 fl      MPV 10.1 fL      Platelets 226 10*3/mm3      Neutrophil % 66.4 %      Lymphocyte % 23.8 %      Monocyte % 8.4 %      Eosinophil % 0.0 %      Basophil % 0.2 %      Immature Grans % 1.2 %      Neutrophils, Absolute 2.67 10*3/mm3      Lymphocytes, Absolute 0.96 10*3/mm3      Monocytes, Absolute 0.34 10*3/mm3      Eosinophils, Absolute 0.00 10*3/mm3      Basophils, Absolute 0.01 10*3/mm3      Immature Grans, Absolute 0.05 10*3/mm3      nRBC 0.0 /100 WBC         Imaging Results (Last 24 Hours)     Procedure Component Value Units Date/Time    US Gallbladder [[id number]] Collected: 08/17/21 2154     Updated: 08/17/21 2154    Narrative:        Patient: ANTOINETTE CALERO  Time Out: 21:53  Exam(s): US GALLBLADDER     EXAM:    US Abdomen Limited, Gallbladder    CLINICAL HISTORY:     Reason for exam: AAA, pre-op planning. Elevated LFTs.    TECHNIQUE:    Real-time ultrasound of the right upper quadrant with image   documentation.    COMPARISON:    None    FINDINGS:    Liver:  Liver measures 16.4 cm.  No focal lesion.    Gallbladder:  Internal echoes within the gallbladder may be secondary   to ring-down artifact (adenomyomatosis) from the gallbladder wall versus   stones sludge.  Nonspecific gallbladder wall thickening.  Negative   sonographic No's sign.    Common bile duct:  Normal common bile duct measuring 4.7 mm.  No stones.    No dilation.    Pancreas:  Visualized portions of the pancreas are grossly unremarkable.      Right kidney:  Right renal cysts measuring up to 1.9 cm.  Right kidney   measures 11.9 cm in length.  No hydronephrosis or stone.    Other vasculature:  Patent main portal vein with normal directional   flow.    Free fluid:  No ascites.    IMPRESSION:         Internal echoes within the gallbladder may be secondary to ring-down    artifact (adenomyomatosis) from the gallbladder wall versus stones sludge.    Nonspecific gallbladder wall thickening.  Negative sonographic No's   sign.      Impression:          Electronically signed by Hossein Ferrera M.D. on 08-17-21 at 2153          Current Facility-Administered Medications:   •  acetaminophen (TYLENOL) tablet 650 mg, 650 mg, Oral, Q6H PRN, Jessee Alvarez MD, 650 mg at 08/16/21 1826  •  albuterol (PROVENTIL) nebulizer solution 0.083% 2.5 mg/3mL, 2.5 mg, Nebulization, Q6H PRN, Jessee Alvarez MD  •  ascorbic acid (VITAMIN C) tablet 500 mg, 500 mg, Oral, Daily, Jessee Alvarez MD, 500 mg at 08/18/21 0916  •  AZITHROMYCIN 500 MG/250 ML 0.9% NS IVPB (vial-mate), 500 mg, Intravenous, Q24H, Jessee Alvarez MD, 500 mg at 08/17/21 1712  •  budesonide-formoterol (SYMBICORT) 160-4.5 MCG/ACT inhaler 2 puff, 2 puff, Inhalation, BID - RT, Jessee Alvarez MD, 2 puff at 08/18/21 0742  •  cefTRIAXone (ROCEPHIN) IVPB 2 g, 2 g, Intravenous, Q24H, Brennan Fajardo MD, Last Rate: 100 mL/hr at 08/18/21 1508, 2 g at 08/18/21 1508  •  cetirizine (zyrTEC) tablet 10 mg, 10 mg, Oral, Daily, Jessee Alvarez MD, 10 mg at 08/18/21 0916  •  cholecalciferol (VITAMIN D3) tablet 1,000 Units, 1,000 Units, Oral, Daily, Jessee Alvarez MD, 1,000 Units at 08/18/21 0916  •  dexamethasone sodium phosphate injection 6 mg, 6 mg, Intravenous, Daily, Brennan Fajardo MD, 6 mg at 08/18/21 0916  •  enoxaparin (LOVENOX) syringe 40 mg, 40 mg, Subcutaneous, Q24H, Jessee Alvarez MD, 40 mg at 08/17/21 1513  •  famotidine (PEPCID) tablet 20 mg, 20 mg, Oral, BID, Jessee Alvarez MD, 20 mg at 08/18/21 0917  •  guaiFENesin (MUCINEX) 12 hr tablet 600 mg, 600 mg, Oral, Q12H, Jessee Alvarez MD, 600 mg at 08/18/21 0917  •  nitroglycerin (NITROSTAT) SL tablet 0.4 mg, 0.4 mg, Sublingual, Q5 Min PRN, Jessee Alvarez MD  •  Pharmacy Consult - Remdesivir, , Does not apply, Continuous PRN, Brennan Fajardo MD  •  [COMPLETED] remdesivir 200 mg in sodium chloride 0.9 % 290 mL IVPB  (powder vial), 200 mg, Intravenous, Q24H, 200 mg at 08/17/21 0039 **FOLLOWED BY** remdesivir 100 mg in sodium chloride 0.9 % 270 mL IVPB (powder vial), 100 mg, Intravenous, Q24H, Brennan Fajardo MD, 100 mg at 08/17/21 2016  •  [COMPLETED] Insert peripheral IV, , , Once **AND** sodium chloride 0.9 % flush 10 mL, 10 mL, Intravenous, PRN, Efra Prieto MD  •  zinc sulfate (ZINCATE) capsule 220 mg, 220 mg, Oral, Daily, Sue Alvarez MD, 220 mg at 08/18/21 0916     ASSESSMENT  COVID-19 pneumonia with superadded bacterial pneumonia  Hyponatremia  Severe dehydration  Elevated liver LFTs resolving  Ex-smoker  Gastroesophageal reflux disease    PLAN  CPM  IVF  Decadron  Remdesivir  Continue antibiotics  Supplemental oxygen as needed  Symbicort twice daily and albuterol as needed  Symptomatic treatment for cough congestion and fever  Supportive care  Discussed with nursing staff  Follow closely further recommendation current hospital course    SUE ALVAREZ MD

## 2021-08-18 NOTE — PROGRESS NOTES
[facility]  Inpatient Progress Note    Reason for Follow Up: Increased liver tests    Subjective     Interval History:   No significant overnight events    Current Facility-Administered Medications:   •  acetaminophen (TYLENOL) tablet 650 mg, 650 mg, Oral, Q6H PRN, Jessee Alvarez MD, 650 mg at 08/16/21 1826  •  albuterol (PROVENTIL) nebulizer solution 0.083% 2.5 mg/3mL, 2.5 mg, Nebulization, Q6H PRN, Jessee Alvarez MD  •  ascorbic acid (VITAMIN C) tablet 500 mg, 500 mg, Oral, Daily, Jessee Alvarez MD, 500 mg at 08/18/21 0916  •  AZITHROMYCIN 500 MG/250 ML 0.9% NS IVPB (vial-mate), 500 mg, Intravenous, Q24H, Jessee Alvarez MD, 500 mg at 08/17/21 1712  •  budesonide-formoterol (SYMBICORT) 160-4.5 MCG/ACT inhaler 2 puff, 2 puff, Inhalation, BID - RT, Jessee Alvarez MD, 2 puff at 08/18/21 0742  •  cefTRIAXone (ROCEPHIN) IVPB 2 g, 2 g, Intravenous, Q24H, Brennan Fajardo MD, Last Rate: 100 mL/hr at 08/17/21 1513, 2 g at 08/17/21 1513  •  cetirizine (zyrTEC) tablet 10 mg, 10 mg, Oral, Daily, Jessee Alvarez MD, 10 mg at 08/18/21 0916  •  cholecalciferol (VITAMIN D3) tablet 1,000 Units, 1,000 Units, Oral, Daily, Jessee Alvarez MD, 1,000 Units at 08/18/21 0916  •  dexamethasone sodium phosphate injection 6 mg, 6 mg, Intravenous, Daily, Brennan Fajardo MD, 6 mg at 08/18/21 0916  •  enoxaparin (LOVENOX) syringe 40 mg, 40 mg, Subcutaneous, Q24H, Jessee Alvarez MD, 40 mg at 08/17/21 1513  •  famotidine (PEPCID) tablet 20 mg, 20 mg, Oral, BID, Jessee Alvarez MD, 20 mg at 08/18/21 0917  •  guaiFENesin (MUCINEX) 12 hr tablet 600 mg, 600 mg, Oral, Q12H, Jessee Alvarez MD, 600 mg at 08/18/21 0917  •  nitroglycerin (NITROSTAT) SL tablet 0.4 mg, 0.4 mg, Sublingual, Q5 Min PRN, Jessee Alvarez MD  •  Pharmacy Consult - Remdesivir, , Does not apply, Continuous PRN, Brennan Fajardo MD  •  [COMPLETED] remdesivir 200 mg in sodium chloride 0.9 % 290 mL IVPB (powder vial), 200 mg, Intravenous, Q24H, 200 mg at 08/17/21 0039 **FOLLOWED  BY** remdesivir 100 mg in sodium chloride 0.9 % 270 mL IVPB (powder vial), 100 mg, Intravenous, Q24H, Brennan Fajardo MD, 100 mg at 08/17/21 2016  •  [COMPLETED] Insert peripheral IV, , , Once **AND** sodium chloride 0.9 % flush 10 mL, 10 mL, Intravenous, PRN, Efra Prieto MD  •  zinc sulfate (ZINCATE) capsule 220 mg, 220 mg, Oral, Daily, Jessee Alvarez MD, 220 mg at 08/18/21 0916  Review of Systems:    There is weakness and fatigue all other systems reviewed and negative    Objective     Vital Signs  Temp:  [94.5 °F (34.7 °C)-99.3 °F (37.4 °C)] 97.3 °F (36.3 °C)  Heart Rate:  [78-91] 81  Resp:  [17-19] 19  BP: (107-124)/(66-77) 107/66  Body mass index is 32.19 kg/m².    Intake/Output Summary (Last 24 hours) at 8/18/2021 1211  Last data filed at 8/18/2021 0919  Gross per 24 hour   Intake 1440 ml   Output --   Net 1440 ml     I/O this shift:  In: 120 [P.O.:120]  Out: -      Physical Exam:   General: patient awake, alert and cooperative   Eyes: Normal lids and lashes, no scleral icterus   Neck: supple, normal ROM   Skin: warm and dry, not jaundiced   Cardiovascular: regular rhythm and rate, no murmurs auscultated   Pulm: clear to auscultation bilaterally, regular and unlabored   Abdomen: soft, nontender, nondistended; normal bowel sounds   Extremities: no rash or edema   Psychiatric: Normal mood and behavior; memory intact     Results Review:     I reviewed the patient's new clinical results.    Results from last 7 days   Lab Units 08/18/21  0321 08/17/21  0901 08/16/21  1340   WBC 10*3/mm3 4.03 5.03 4.38   HEMOGLOBIN g/dL 11.9* 13.5 13.7   HEMATOCRIT % 35.1* 39.7 39.3   PLATELETS 10*3/mm3 226 189 178     Results from last 7 days   Lab Units 08/18/21  0321 08/17/21  0901 08/16/21  1340   SODIUM mmol/L 135* 130* 125*   POTASSIUM mmol/L 3.9 3.6 3.6   CHLORIDE mmol/L 105 100 92*   CO2 mmol/L 19.0* 20.3* 22.8   BUN mg/dL 8 6 7   CREATININE mg/dL 0.60* 0.76 0.87   CALCIUM mg/dL 8.0* 8.1* 8.1*   BILIRUBIN mg/dL 0.3 0.3 0.5    ALK PHOS U/L 85 104 99   ALT (SGPT) U/L 238* 238* 143*   AST (SGOT) U/L 243* 305* 203*   GLUCOSE mg/dL 91 96 97     Results from last 7 days   Lab Units 08/16/21  1340   INR  0.94     No results found for: LIPASE    Radiology:  US Gallbladder   Final Result         Electronically signed by Hossein Ferrera M.D. on 08-17-21 at 2153      CT Angiogram Chest   Final Result   1. There is no evidence for pulmonary thromboemboli.   2. Central and peripheral patchy and diffuse ground-glass opacities   within both lung fields likely represents atypical pneumonia. The mildly   enlarged hilar nodes are likely reactive. Reevaluation is recommended   with a contrast enhanced chest CT in 3 months.       Discussed with Dr. Short.       This report was finalized on 8/17/2021 7:09 AM by Dr. Natalia Smith M.D.          XR Chest 1 View   Final Result        Ultrasound right upper quadrant with stones but no evidence of common bile duct dilation or cholecystitis or other causes of elevated liver tests      Assessment/Plan     Patient Active Problem List   Diagnosis   • Pneumonia due to COVID-19 virus       Elevated liver tests in a COVID-19 patient. We can see elevated liver tests in COVID-19 patients in upwards of 50% of people with this acute viral illness  It resolves on its own with the resolution of active viral particles  Ultrasound unrevealing for source of elevated liver tests  Follow liver tests daily, while hospitalized  No further work-up required at this time  I discussed the patients findings and my recommendations with patient and nursing staff.    Hubert De Leon MD

## 2021-08-18 NOTE — PROGRESS NOTES
"  Infectious Diseases Progress Note    Brennan Fajardo MD     [facility]  [place]: 2 days  Patient Identification:  Name: Jeronimo Bain  Age: 56 y.o.  Sex: male  :  1965  MRN: [id number]         Primary Care Physician: Edgar Sanchez MD            Subjective: Continues to feel better and stronger.  Interval History: See consultation note.    Objective:    Scheduled Meds:vitamin C, 500 mg, Oral, Daily  azithromycin, 500 mg, Intravenous, Q24H  budesonide-formoterol, 2 puff, Inhalation, BID - RT  cefTRIAXone, 2 g, Intravenous, Q24H  cetirizine, 10 mg, Oral, Daily  cholecalciferol, 1,000 Units, Oral, Daily  dexamethasone, 6 mg, Intravenous, Daily  enoxaparin, 40 mg, Subcutaneous, Q24H  famotidine, 20 mg, Oral, BID  guaiFENesin, 600 mg, Oral, Q12H  remdesivir, 100 mg, Intravenous, Q24H  zinc sulfate, 220 mg, Oral, Daily      Continuous Infusions:Pharmacy Consult - Remdesivir,   sodium chloride 0.9 % with KCl 20 mEq, 75 mL/hr, Last Rate: 75 mL/hr (21 0518)        Vital signs in last 24 hours:  Temp:  [94.5 °F (34.7 °C)-99.3 °F (37.4 °C)] 97.3 °F (36.3 °C)  Heart Rate:  [75-97] 81  Resp:  [17-20] 19  BP: (107-124)/(66-85) 107/66    Intake/Output:    Intake/Output Summary (Last 24 hours) at 2021 1041  Last data filed at 2021  Gross per 24 hour   Intake 1320 ml   Output --   Net 1320 ml       Exam:  /66 (BP Location: Right arm, Patient Position: Lying)   Pulse 81   Temp 97.3 °F (36.3 °C) (Oral)   Resp 19   Ht 175.3 cm (69\")   Wt 98.9 kg (218 lb)   SpO2 96%   BMI 32.19 kg/m²   Patient is examined using the personal protective equipment as per guidelines from infection control for this particular patient as enacted.  Hand washing was performed before and after patient interaction.  General Appearance:    Alert, cooperative, no distress, AAOx3  Patient examined from distance and only observation to his appearance respiratory status and overall outlook was made.       Data " Review:    I reviewed the patient's new clinical results.  Results from last 7 days   Lab Units 08/18/21  0321 08/17/21  0901 08/16/21  1340   WBC 10*3/mm3 4.03 5.03 4.38   HEMOGLOBIN g/dL 11.9* 13.5 13.7   PLATELETS 10*3/mm3 226 189 178     Results from last 7 days   Lab Units 08/18/21  0321 08/17/21  0901 08/16/21  1340   SODIUM mmol/L 135* 130* 125*   POTASSIUM mmol/L 3.9 3.6 3.6   CHLORIDE mmol/L 105 100 92*   CO2 mmol/L 19.0* 20.3* 22.8   BUN mg/dL 8 6 7   CREATININE mg/dL 0.60* 0.76 0.87   CALCIUM mg/dL 8.0* 8.1* 8.1*   GLUCOSE mg/dL 91 96 97     Blood Culture   Date Value Ref Range Status   08/16/2021 No growth at 24 hours  Preliminary     No results found for: BCIDPCR, CXREFLEX, CSFCX, CULTURETIS  No results found for: CULTURES, HSVCX, URCX  No results found for: EYECULTURE, GCCX, HSVCULTURE, LABHSV  No results found for: LEGIONELLA, MRSACX, MUMPSCX, MYCOPLASCX  No results found for: NOCARDIACX, STOOLCX  No results found for: THROATCX, UNSTIMCULT, URINECX, CULTURE, VZVCULTUR  No results found for: VIRALCULTU, WOUNDCX  Microbiology Results (last 10 days)     Procedure Component Value - Date/Time    Respiratory Panel PCR w/COVID-19(SARS-CoV-2) DANNY/ERICA/SERGIO/PAD/COR/MAD/EUGENE In-House, NP Swab in UTM/VTM, 3-4 HR TAT - Swab, Nasopharynx [[id number]]  (Abnormal) Collected: 08/16/21 1537    Lab Status: Final result Specimen: Swab from Nasopharynx Updated: 08/16/21 3432     ADENOVIRUS, PCR Not Detected     Coronavirus 229E Not Detected     Coronavirus HKU1 Not Detected     Coronavirus NL63 Not Detected     Coronavirus OC43 Not Detected     COVID19 Detected     Human Metapneumovirus Not Detected     Human Rhinovirus/Enterovirus Not Detected     Influenza A PCR Not Detected     Influenza B PCR Not Detected     Parainfluenza Virus 1 Not Detected     Parainfluenza Virus 2 Not Detected     Parainfluenza Virus 3 Not Detected     Parainfluenza Virus 4 Not Detected     RSV, PCR Not Detected     Bordetella pertussis pcr Not  Detected     Bordetella parapertussis PCR Not Detected     Chlamydophila pneumoniae PCR Not Detected     Mycoplasma pneumo by PCR Not Detected    Narrative:      In the setting of a positive respiratory panel with a viral infection PLUS a negative procalcitonin without other underlying concern for bacterial infection, consider observing off antibiotics or discontinuation of antibiotics and continue supportive care. If the respiratory panel is positive for atypical bacterial infection (Bordetella pertussis, Chlamydophila pneumoniae, or Mycoplasma pneumoniae), consider antibiotic de-escalation to target atypical bacterial infection.    Blood Culture - Blood, Arm, Left [[id number]] Collected: 08/16/21 1530    Lab Status: Preliminary result Specimen: Blood from Arm, Left Updated: 08/17/21 1545     Blood Culture No growth at 24 hours    S. Pneumo Ag Urine or CSF - Urine, Urine, Clean Catch [[id number]]  (Normal) Collected: 08/16/21 1437    Lab Status: Final result Specimen: Urine, Clean Catch Updated: 08/16/21 2148     Strep Pneumo Ag Negative    Legionella Antigen, Urine - Urine, Urine, Clean Catch [[id number]]  (Normal) Collected: 08/16/21 1437    Lab Status: Final result Specimen: Urine, Clean Catch Updated: 08/16/21 2148     LEGIONELLA ANTIGEN, URINE Negative    Blood Culture - Blood, Arm, Left [[id number]] Collected: 08/16/21 1411    Lab Status: Preliminary result Specimen: Blood from Arm, Left Updated: 08/17/21 1430     Blood Culture No growth at 24 hours    Blood Culture - Blood, Arm, Right [[id number]] Collected: 08/16/21 1343    Lab Status: Preliminary result Specimen: Blood from Arm, Right Updated: 08/17/21 1430     Blood Culture No growth at 24 hours            Assessment:  1-systemic COVID-19 infection with pneumonia and possible secondary bacterial process given the duration of his symptoms.  2-hypoxia episodically especially with activity and exertion makes him at high risk of progression of  disease.  3-Hyponatremia likely multifactorial including SIADH due to lung infection and associated dehydration due to decreased intake.  4-abnormal LFTs        Recommendations/Discussions:  · Continue with close monitoring of his respiratory status and symptom wellbeing and oxygen requirement.  · If his LFTs continue to get worse and remdesivir may have to be discontinued while continuing with steroids and completing 5-day course of empiric treatment antibiotic for secondary bacterial infection given the atypical presentation that he has.  · Follow-up on liver ultrasound.  Brennan Fajardo MD  8/18/2021  10:41 EDT    Much of this encounter note is an electronic transcription/translation of spoken language to printed text. The electronic translation of spoken language may permit erroneous, or at times, nonsensical words or phrases to be inadvertently transcribed; Although I have reviewed the note for such errors, some may still exist

## 2021-08-18 NOTE — PLAN OF CARE
Goal Outcome Evaluation:  Plan of Care Reviewed With: patient        Progress: improving  Outcome Summary: Pt up in chair and has tolerated well. Weaned down to 4L and has remained at 94% Educated on use of IS and has done well with about 1000 at best. Antibiotics continued via IV. VSS. Safety maintained. Will continue to monitor.

## 2021-08-19 ENCOUNTER — READMISSION MANAGEMENT (OUTPATIENT)
Dept: CALL CENTER | Facility: HOSPITAL | Age: 56
End: 2021-08-19

## 2021-08-19 VITALS
WEIGHT: 218 LBS | BODY MASS INDEX: 32.29 KG/M2 | SYSTOLIC BLOOD PRESSURE: 113 MMHG | TEMPERATURE: 95.9 F | DIASTOLIC BLOOD PRESSURE: 74 MMHG | OXYGEN SATURATION: 96 % | HEART RATE: 70 BPM | HEIGHT: 69 IN | RESPIRATION RATE: 16 BRPM

## 2021-08-19 LAB
ALBUMIN SERPL-MCNC: 2.9 G/DL (ref 3.5–5.2)
ALBUMIN/GLOB SERPL: 1 G/DL
ALP SERPL-CCNC: 88 U/L (ref 39–117)
ALT SERPL W P-5'-P-CCNC: 196 U/L (ref 1–41)
ANION GAP SERPL CALCULATED.3IONS-SCNC: 11.9 MMOL/L (ref 5–15)
AST SERPL-CCNC: 125 U/L (ref 1–40)
BASOPHILS # BLD AUTO: 0.01 10*3/MM3 (ref 0–0.2)
BASOPHILS NFR BLD AUTO: 0.3 % (ref 0–1.5)
BILIRUB CONJ SERPL-MCNC: <0.2 MG/DL (ref 0–0.3)
BILIRUB SERPL-MCNC: 0.3 MG/DL (ref 0–1.2)
BUN SERPL-MCNC: 7 MG/DL (ref 6–20)
BUN/CREAT SERPL: 11.1 (ref 7–25)
CALCIUM SPEC-SCNC: 8.3 MG/DL (ref 8.6–10.5)
CHLORIDE SERPL-SCNC: 105 MMOL/L (ref 98–107)
CO2 SERPL-SCNC: 18.1 MMOL/L (ref 22–29)
CREAT SERPL-MCNC: 0.63 MG/DL (ref 0.76–1.27)
CRP SERPL-MCNC: 3.86 MG/DL (ref 0–0.5)
DEPRECATED RDW RBC AUTO: 42.4 FL (ref 37–54)
EOSINOPHIL # BLD AUTO: 0 10*3/MM3 (ref 0–0.4)
EOSINOPHIL NFR BLD AUTO: 0 % (ref 0.3–6.2)
ERYTHROCYTE [DISTWIDTH] IN BLOOD BY AUTOMATED COUNT: 12.9 % (ref 12.3–15.4)
GFR SERPL CREATININE-BSD FRML MDRD: 131 ML/MIN/1.73
GLOBULIN UR ELPH-MCNC: 3 GM/DL
GLUCOSE SERPL-MCNC: 107 MG/DL (ref 65–99)
HCT VFR BLD AUTO: 38.9 % (ref 37.5–51)
HGB BLD-MCNC: 12.7 G/DL (ref 13–17.7)
IMM GRANULOCYTES # BLD AUTO: 0.06 10*3/MM3 (ref 0–0.05)
IMM GRANULOCYTES NFR BLD AUTO: 1.6 % (ref 0–0.5)
LYMPHOCYTES # BLD AUTO: 0.73 10*3/MM3 (ref 0.7–3.1)
LYMPHOCYTES NFR BLD AUTO: 19.5 % (ref 19.6–45.3)
MCH RBC QN AUTO: 29.3 PG (ref 26.6–33)
MCHC RBC AUTO-ENTMCNC: 32.6 G/DL (ref 31.5–35.7)
MCV RBC AUTO: 89.6 FL (ref 79–97)
MONOCYTES # BLD AUTO: 0.36 10*3/MM3 (ref 0.1–0.9)
MONOCYTES NFR BLD AUTO: 9.6 % (ref 5–12)
NEUTROPHILS NFR BLD AUTO: 2.59 10*3/MM3 (ref 1.7–7)
NEUTROPHILS NFR BLD AUTO: 69 % (ref 42.7–76)
NRBC BLD AUTO-RTO: 0 /100 WBC (ref 0–0.2)
PLATELET # BLD AUTO: 273 10*3/MM3 (ref 140–450)
PMV BLD AUTO: 10 FL (ref 6–12)
POTASSIUM SERPL-SCNC: 4 MMOL/L (ref 3.5–5.2)
PROT SERPL-MCNC: 5.9 G/DL (ref 6–8.5)
RBC # BLD AUTO: 4.34 10*6/MM3 (ref 4.14–5.8)
SODIUM SERPL-SCNC: 135 MMOL/L (ref 136–145)
WBC # BLD AUTO: 3.75 10*3/MM3 (ref 3.4–10.8)

## 2021-08-19 PROCEDURE — 25010000002 DEXAMETHASONE SODIUM PHOSPHATE 10 MG/ML SOLUTION: Performed by: INTERNAL MEDICINE

## 2021-08-19 PROCEDURE — 80053 COMPREHEN METABOLIC PANEL: CPT | Performed by: HOSPITALIST

## 2021-08-19 PROCEDURE — 94799 UNLISTED PULMONARY SVC/PX: CPT

## 2021-08-19 PROCEDURE — 99232 SBSQ HOSP IP/OBS MODERATE 35: CPT | Performed by: NURSE PRACTITIONER

## 2021-08-19 PROCEDURE — 82248 BILIRUBIN DIRECT: CPT | Performed by: INTERNAL MEDICINE

## 2021-08-19 PROCEDURE — 85025 COMPLETE CBC W/AUTO DIFF WBC: CPT | Performed by: HOSPITALIST

## 2021-08-19 PROCEDURE — 86140 C-REACTIVE PROTEIN: CPT | Performed by: HOSPITALIST

## 2021-08-19 RX ORDER — MELATONIN
1000 DAILY
Qty: 30 TABLET | Refills: 0 | Status: SHIPPED | OUTPATIENT
Start: 2021-08-20 | End: 2021-09-19

## 2021-08-19 RX ORDER — CEFDINIR 300 MG/1
300 CAPSULE ORAL EVERY 12 HOURS SCHEDULED
Status: DISCONTINUED | OUTPATIENT
Start: 2021-08-19 | End: 2021-08-19 | Stop reason: HOSPADM

## 2021-08-19 RX ORDER — DEXAMETHASONE 6 MG/1
6 TABLET ORAL
Status: DISCONTINUED | OUTPATIENT
Start: 2021-08-20 | End: 2021-08-19 | Stop reason: HOSPADM

## 2021-08-19 RX ORDER — FAMOTIDINE 20 MG/1
20 TABLET, FILM COATED ORAL 2 TIMES DAILY
Qty: 60 TABLET | Refills: 0 | Status: SHIPPED | OUTPATIENT
Start: 2021-08-19 | End: 2021-09-18

## 2021-08-19 RX ORDER — DEXAMETHASONE 6 MG/1
6 TABLET ORAL
Qty: 5 TABLET | Refills: 0 | Status: SHIPPED | OUTPATIENT
Start: 2021-08-20 | End: 2021-08-25

## 2021-08-19 RX ORDER — CEFDINIR 300 MG/1
300 CAPSULE ORAL EVERY 12 HOURS SCHEDULED
Qty: 10 CAPSULE | Refills: 0 | Status: SHIPPED | OUTPATIENT
Start: 2021-08-19 | End: 2021-08-24

## 2021-08-19 RX ORDER — GUAIFENESIN 600 MG/1
600 TABLET, EXTENDED RELEASE ORAL EVERY 12 HOURS SCHEDULED
Qty: 60 TABLET | Refills: 0 | Status: SHIPPED | OUTPATIENT
Start: 2021-08-19 | End: 2021-09-18

## 2021-08-19 RX ADMIN — BUDESONIDE AND FORMOTEROL FUMARATE DIHYDRATE 2 PUFF: 160; 4.5 AEROSOL RESPIRATORY (INHALATION) at 07:50

## 2021-08-19 RX ADMIN — Medication 220 MG: at 09:06

## 2021-08-19 RX ADMIN — CETIRIZINE HYDROCHLORIDE 10 MG: 10 TABLET ORAL at 09:06

## 2021-08-19 RX ADMIN — OXYCODONE HYDROCHLORIDE AND ACETAMINOPHEN 500 MG: 500 TABLET ORAL at 09:06

## 2021-08-19 RX ADMIN — Medication 1000 UNITS: at 09:06

## 2021-08-19 RX ADMIN — DEXAMETHASONE SODIUM PHOSPHATE 6 MG: 10 INJECTION, SOLUTION INTRAMUSCULAR; INTRAVENOUS at 09:06

## 2021-08-19 RX ADMIN — REMDESIVIR 100 MG: 100 INJECTION, POWDER, LYOPHILIZED, FOR SOLUTION INTRAVENOUS at 13:53

## 2021-08-19 RX ADMIN — GUAIFENESIN 600 MG: 600 TABLET, EXTENDED RELEASE ORAL at 09:06

## 2021-08-19 RX ADMIN — FAMOTIDINE 20 MG: 20 TABLET, FILM COATED ORAL at 10:29

## 2021-08-19 NOTE — PROGRESS NOTES
Discharge Planning Assessment  [facility]     Patient Name: Jeronimo Bain  MRN: [id number]  Today's Date: 8/19/2021    Admit Date: 8/16/2021    Discharge Needs Assessment    No documentation.       Discharge Plan     Row Name 08/19/21 1219       Plan    Plan  Oxygen saturation 88% room air at rest. Zaria OLIVERA        Continued Care and Services - Admitted Since 8/16/2021    Coordination has not been started for this encounter.       Expected Discharge Date and Time     Expected Discharge Date Expected Discharge Time    Aug 21, 2021         Demographic Summary    No documentation.       Functional Status    No documentation.       Psychosocial    No documentation.       Abuse/Neglect    No documentation.       Legal    No documentation.       Substance Abuse    No documentation.       Patient Forms    No documentation.           Irlanda Schulz, RN

## 2021-08-19 NOTE — PLAN OF CARE
Goal Outcome Evaluation:  Plan of Care Reviewed With: patient        Progress: improving  Outcome Summary: Vss. SB during sleep , ST in 120s when up to ambulate. 2-3Lnc.

## 2021-08-19 NOTE — DISCHARGE SUMMARY
Discharge summary    Date of admission 8/16/2021  Date of discharge 8/19/2021    Final diagnosis  COVID-19 pneumonia with superadded bacterial pneumonia  Hyponatremia corrected  S/P dehydration  Elevated liver LFTs resolving  Ex-smoker  Gastroesophageal reflux disease    Discharge medications    Current Facility-Administered Medications:   •  acetaminophen (TYLENOL) tablet 650 mg, 650 mg, Oral, Q6H PRN, Jessee Alvarez MD, 650 mg at 08/16/21 1826  •  albuterol (PROVENTIL) nebulizer solution 0.083% 2.5 mg/3mL, 2.5 mg, Nebulization, Q6H PRN, Jessee Alvarez MD  •  budesonide-formoterol (SYMBICORT) 160-4.5 MCG/ACT inhaler 2 puff, 2 puff, Inhalation, BID - RT, Jessee Alvarez MD, 2 puff at 08/19/21 0750  •  cefdinir (OMNICEF) capsule 300 mg, 300 mg, Oral, Q12H, Jessee Alvarez MD  •  cholecalciferol (VITAMIN D3) tablet 1,000 Units, 1,000 Units, Oral, Daily, Jessee Alvarez MD, 1,000 Units at 08/19/21 0906  •  [START ON 8/20/2021] dexamethasone (DECADRON) tablet 6 mg, 6 mg, Oral, Daily With Breakfast, Jessee Alvarez MD  •  enoxaparin (LOVENOX) syringe 40 mg, 40 mg, Subcutaneous, Q24H, Jessee Alvarez MD, 40 mg at 08/18/21 1646  •  famotidine (PEPCID) tablet 20 mg, 20 mg, Oral, BID, Jessee Alvarez MD, 20 mg at 08/19/21 1029  •  guaiFENesin (MUCINEX) 12 hr tablet 600 mg, 600 mg, Oral, Q12H, Jessee Alvarez MD, 600 mg at 08/19/21 0906  •  nitroglycerin (NITROSTAT) SL tablet 0.4 mg, 0.4 mg, Sublingual, Q5 Min PRN, Jessee Alvarez MD  •  Pharmacy Consult - Remdesivir, , Does not apply, Continuous PRN, Brennan Fajardo MD  •  [COMPLETED] remdesivir 200 mg in sodium chloride 0.9 % 290 mL IVPB (powder vial), 200 mg, Intravenous, Q24H, 200 mg at 08/17/21 0039 **FOLLOWED BY** remdesivir 100 mg in sodium chloride 0.9 % 270 mL IVPB (powder vial), 100 mg, Intravenous, Q24H, Brennan Fajardo MD, 100 mg at 08/18/21 2054  •  [COMPLETED] Insert peripheral IV, , , Once **AND** sodium chloride 0.9 % flush 10 mL, 10 mL, Intravenous, PRN, Efra Prieto MD      Consults obtained  Infectious disease  Gastroenterology    Procedures  None    Hospital course  56-year-old white male with no medical problem no home medication who was ex-smoker admitted to emergency room with shortness of breath nonproductive cough and generalized weakness.  Patient work-up in ER revealed COVID-19 pneumonia and also found to have superadded bacterial pneumonia admit for management.  Patient also have elevated liver enzymes and severe dehydration with hyponatremia.  Patient admitted treated with supplemental oxygen nebulizer Decadron remdesivir and empiric antibiotics.  Patient responded to the treatment and feeling much better and his oxygen saturation 96% on 2 L.  Patient be discharged home after today's dose of remdesivir and remaining dose of Decadron and continue oral antibiotics for 5 more days.  Patient will continue quarantine for total of 14 days.  Patient also followed by gastroenterology and his liver enzymes are coming to normal.  Patient hepatitis profile remain negative.  Patient cleared for discharge.    Discharge diet regular    Activity as tolerated    Medication as above    Follow-up with primary doctor in 1 week and follow-up with infectious disease and gastroenterology per the instruction and take medication as directed    SUE MAS MD

## 2021-08-19 NOTE — PROGRESS NOTES
"Daily progress note    Chief complaint  Doing better  No new complaints  Denies cough congestion or shortness of breath  Wants to go home    History of present illness  56-year-old white male with no medical problem home medication who is an ex-smoker and drinks socially who has received 1 dose of COVID-19 vaccine earlier this month presented to [facility] emergency room with shortness of breath nonproductive cough congestion fatigue tired and developed fever today.  Patient denies any loss of taste and smell but has no appetite.  Patient has been tested positive for Covid 6 days ago.  Patient work-up in ER revealed COVID-19 pneumonia and possible bacterial pneumonia admit for management.  Patient also found to have hyponatremia and elevated liver enzymes with severe dehydration      REVIEW OF SYSTEMS  Unremarkable      PHYSICAL EXAM   Blood pressure 123/75, pulse 65, temperature 96.5 °F (35.8 °C), temperature source Oral, resp. rate 19, height 175.3 cm (69\"), weight 98.9 kg (218 lb), SpO2 94 %.    GENERAL: Male that appears weak and tired.  No acute distress.  HENT: nares patent  Head/neck/ face are symmetric without gross deformity, signs of trauma, or swelling  EYES: no scleral icterus, no conjunctival pallor.  NECK: Supple, no meningismus  CV: regular rhythm, regular rate with intact distal pulses.  No murmur or rub  RESPIRATORY: normal effort and no respiratory distress.  Rhonchi in the base of his lungs bilaterally on auscultation.  ABDOMEN: soft and nontender.  Obese  MUSCULOSKELETAL: no deformity.  No edema.  Intact distal pulses to upper and lower extremities that are equal strong and symmetric.  NEURO: alert and appropriate, moves all extremities, follows commands.  No focal motor or sensory changes.  SKIN: warm, dry     LAB RESULTS  Lab Results (last 24 hours)     Procedure Component Value Units Date/Time    Comprehensive Metabolic Panel [[id number]]  (Abnormal) Collected: 08/19/21 0351    " Specimen: Blood Updated: 08/19/21 0633     Glucose 107 mg/dL      BUN 7 mg/dL      Creatinine 0.63 mg/dL      Sodium 135 mmol/L      Potassium 4.0 mmol/L      Comment: Slight hemolysis detected by analyzer. Results may be affected.        Chloride 105 mmol/L      CO2 18.1 mmol/L      Calcium 8.3 mg/dL      Total Protein 5.9 g/dL      Albumin 2.90 g/dL      ALT (SGPT) 196 U/L      AST (SGOT) 125 U/L      Alkaline Phosphatase 88 U/L      Total Bilirubin 0.3 mg/dL      eGFR Non African Amer 131 mL/min/1.73      Globulin 3.0 gm/dL      A/G Ratio 1.0 g/dL      BUN/Creatinine Ratio 11.1     Anion Gap 11.9 mmol/L     Narrative:      GFR Normal >60  Chronic Kidney Disease <60  Kidney Failure <15      Bilirubin, Direct [[id number]]  (Normal) Collected: 08/19/21 0351    Specimen: Blood Updated: 08/19/21 0633     Bilirubin, Direct <0.2 mg/dL     C-reactive Protein [[id number]]  (Abnormal) Collected: 08/19/21 0351    Specimen: Blood Updated: 08/19/21 0633     C-Reactive Protein 3.86 mg/dL     CBC & Differential [[id number]]  (Abnormal) Collected: 08/19/21 0351    Specimen: Blood Updated: 08/19/21 0512    Narrative:      The following orders were created for panel order CBC & Differential.  Procedure                               Abnormality         Status                     ---------                               -----------         ------                     CBC Auto Differential[[id number]]        Abnormal            Final result                 Please view results for these tests on the individual orders.    CBC Auto Differential [[id number]]  (Abnormal) Collected: 08/19/21 0351    Specimen: Blood Updated: 08/19/21 0512     WBC 3.75 10*3/mm3      RBC 4.34 10*6/mm3      Hemoglobin 12.7 g/dL      Hematocrit 38.9 %      MCV 89.6 fL      MCH 29.3 pg      MCHC 32.6 g/dL      RDW 12.9 %      RDW-SD 42.4 fl      MPV 10.0 fL      Platelets 273 10*3/mm3      Neutrophil % 69.0 %      Lymphocyte % 19.5 %      Monocyte % 9.6 %       Eosinophil % 0.0 %      Basophil % 0.3 %      Immature Grans % 1.6 %      Neutrophils, Absolute 2.59 10*3/mm3      Lymphocytes, Absolute 0.73 10*3/mm3      Monocytes, Absolute 0.36 10*3/mm3      Eosinophils, Absolute 0.00 10*3/mm3      Basophils, Absolute 0.01 10*3/mm3      Immature Grans, Absolute 0.06 10*3/mm3      nRBC 0.0 /100 WBC         Imaging Results (Last 24 Hours)     ** No results found for the last 24 hours. **          Current Facility-Administered Medications:   •  acetaminophen (TYLENOL) tablet 650 mg, 650 mg, Oral, Q6H PRN, Jessee Alvarez MD, 650 mg at 08/16/21 1826  •  albuterol (PROVENTIL) nebulizer solution 0.083% 2.5 mg/3mL, 2.5 mg, Nebulization, Q6H PRN, Jessee Alvarez MD  •  ascorbic acid (VITAMIN C) tablet 500 mg, 500 mg, Oral, Daily, Jessee Alvarez MD, 500 mg at 08/19/21 0906  •  AZITHROMYCIN 500 MG/250 ML 0.9% NS IVPB (vial-mate), 500 mg, Intravenous, Q24H, Jessee Alvarez MD, 500 mg at 08/18/21 1647  •  budesonide-formoterol (SYMBICORT) 160-4.5 MCG/ACT inhaler 2 puff, 2 puff, Inhalation, BID - RT, Jessee Alvarez MD, 2 puff at 08/19/21 0750  •  cefTRIAXone (ROCEPHIN) IVPB 2 g, 2 g, Intravenous, Q24H, Brennan Fajardo MD, Last Rate: 100 mL/hr at 08/18/21 1508, 2 g at 08/18/21 1508  •  cetirizine (zyrTEC) tablet 10 mg, 10 mg, Oral, Daily, Jessee Alvarez MD, 10 mg at 08/19/21 0906  •  cholecalciferol (VITAMIN D3) tablet 1,000 Units, 1,000 Units, Oral, Daily, Jessee Alvarez MD, 1,000 Units at 08/19/21 0906  •  dexamethasone sodium phosphate injection 6 mg, 6 mg, Intravenous, Daily, Brennan Fajardo MD, 6 mg at 08/19/21 0906  •  enoxaparin (LOVENOX) syringe 40 mg, 40 mg, Subcutaneous, Q24H, Jessee Alvarez MD, 40 mg at 08/18/21 1646  •  famotidine (PEPCID) tablet 20 mg, 20 mg, Oral, BID, Jessee Alvarez MD, 20 mg at 08/19/21 1029  •  guaiFENesin (MUCINEX) 12 hr tablet 600 mg, 600 mg, Oral, Q12H, Jessee Alvarez MD, 600 mg at 08/19/21 0906  •  nitroglycerin (NITROSTAT) SL tablet 0.4 mg, 0.4 mg, Sublingual, Q5 Min  PRN, Sue Alvarez MD  •  Pharmacy Consult - Remdesivir, , Does not apply, Continuous PRN, Brennan Fajardo MD  •  [COMPLETED] remdesivir 200 mg in sodium chloride 0.9 % 290 mL IVPB (powder vial), 200 mg, Intravenous, Q24H, 200 mg at 08/17/21 0039 **FOLLOWED BY** remdesivir 100 mg in sodium chloride 0.9 % 270 mL IVPB (powder vial), 100 mg, Intravenous, Q24H, Brennan Fajardo MD, 100 mg at 08/18/21 2054  •  [COMPLETED] Insert peripheral IV, , , Once **AND** sodium chloride 0.9 % flush 10 mL, 10 mL, Intravenous, PRN, Efra Prieto MD  •  zinc sulfate (ZINCATE) capsule 220 mg, 220 mg, Oral, Daily, Sue Alvarez MD, 220 mg at 08/19/21 0906     ASSESSMENT  COVID-19 pneumonia with superadded bacterial pneumonia  Hyponatremia  Severe dehydration  Elevated liver LFTs resolving  Ex-smoker  Gastroesophageal reflux disease    PLAN  Discharge home on home oxygen and remaining 5 days of Decadron and 5 more days of antibiotics after the last dose of remdesivir today  Discharge summary dictated    SUE ALVAREZ MD

## 2021-08-19 NOTE — PROGRESS NOTES
Gastroenterology   Inpatient Progress Note    Reason for Follow Up: Elevated liver enzymes    Subjective     Interval History:   Patient states that he is feeling better today.  Has no complaints.  Tolerating diet.    Current Facility-Administered Medications:   •  acetaminophen (TYLENOL) tablet 650 mg, 650 mg, Oral, Q6H PRN, Jessee Alvarez MD, 650 mg at 08/16/21 1826  •  albuterol (PROVENTIL) nebulizer solution 0.083% 2.5 mg/3mL, 2.5 mg, Nebulization, Q6H PRN, Jessee Alvarez MD  •  ascorbic acid (VITAMIN C) tablet 500 mg, 500 mg, Oral, Daily, Jessee Alvarez MD, 500 mg at 08/19/21 0906  •  AZITHROMYCIN 500 MG/250 ML 0.9% NS IVPB (vial-mate), 500 mg, Intravenous, Q24H, Jessee Alvarez MD, 500 mg at 08/18/21 1647  •  budesonide-formoterol (SYMBICORT) 160-4.5 MCG/ACT inhaler 2 puff, 2 puff, Inhalation, BID - RT, eJssee Alvarez MD, 2 puff at 08/19/21 0750  •  cefTRIAXone (ROCEPHIN) IVPB 2 g, 2 g, Intravenous, Q24H, Brennan Fajardo MD, Last Rate: 100 mL/hr at 08/18/21 1508, 2 g at 08/18/21 1508  •  cetirizine (zyrTEC) tablet 10 mg, 10 mg, Oral, Daily, Jessee Alvarez MD, 10 mg at 08/19/21 0906  •  cholecalciferol (VITAMIN D3) tablet 1,000 Units, 1,000 Units, Oral, Daily, Jessee Alvarez MD, 1,000 Units at 08/19/21 0906  •  dexamethasone sodium phosphate injection 6 mg, 6 mg, Intravenous, Daily, Brennan Fajardo MD, 6 mg at 08/19/21 0906  •  enoxaparin (LOVENOX) syringe 40 mg, 40 mg, Subcutaneous, Q24H, Jessee Alvarez MD, 40 mg at 08/18/21 1646  •  famotidine (PEPCID) tablet 20 mg, 20 mg, Oral, BID, Jessee Alvarez MD, 20 mg at 08/19/21 1029  •  guaiFENesin (MUCINEX) 12 hr tablet 600 mg, 600 mg, Oral, Q12H, Jessee Alvarez MD, 600 mg at 08/19/21 0906  •  nitroglycerin (NITROSTAT) SL tablet 0.4 mg, 0.4 mg, Sublingual, Q5 Min PRN, Jessee Alvarez MD  •  Pharmacy Consult - Remdesivir, , Does not apply, Continuous PRN, Brennan Fajardo MD  •  [COMPLETED] remdesivir 200 mg in sodium chloride 0.9 % 290 mL IVPB (powder vial), 200 mg, Intravenous, Q24H,  200 mg at 08/17/21 0039 **FOLLOWED BY** remdesivir 100 mg in sodium chloride 0.9 % 270 mL IVPB (powder vial), 100 mg, Intravenous, Q24H, Brennan Fajardo MD, 100 mg at 08/18/21 2054  •  [COMPLETED] Insert peripheral IV, , , Once **AND** sodium chloride 0.9 % flush 10 mL, 10 mL, Intravenous, PRN, Efra Prieto MD  •  zinc sulfate (ZINCATE) capsule 220 mg, 220 mg, Oral, Daily, Jessee Alvarez MD, 220 mg at 08/19/21 0906  Review of Systems:    The following systems were reviewed and negative;  gastrointestinal    Objective     Vital Signs  Temp:  [96.3 °F (35.7 °C)-97.7 °F (36.5 °C)] 96.5 °F (35.8 °C)  Heart Rate:  [59-78] 65  Resp:  [18-19] 19  BP: (123-125)/(72-92) 123/75  Body mass index is 32.19 kg/m².    Intake/Output Summary (Last 24 hours) at 8/19/2021 1312  Last data filed at 8/18/2021 1705  Gross per 24 hour   Intake 120 ml   Output --   Net 120 ml     No intake/output data recorded.     Physical Exam:   General: patient awake, alert and cooperative   Eyes: no scleral icterus   Skin: warm and dry, not jaundiced   Abdomen: soft, nontender, nondistended; normal bowel sounds, no masses palpated, no periumbical lymphadenopathy   Psychiatric: Appropriate affect and behavior     Results Review:     I reviewed the patient's new clinical results.  I reviewed the patient's new imaging results and agree with the interpretation.  I reviewed the patient's other test results and agree with the interpretation    Results from last 7 days   Lab Units 08/19/21  0351 08/18/21  0321 08/17/21  0901   WBC 10*3/mm3 3.75 4.03 5.03   HEMOGLOBIN g/dL 12.7* 11.9* 13.5   HEMATOCRIT % 38.9 35.1* 39.7   PLATELETS 10*3/mm3 273 226 189     Results from last 7 days   Lab Units 08/19/21  0351 08/18/21  0321 08/17/21  0901   SODIUM mmol/L 135* 135* 130*   POTASSIUM mmol/L 4.0 3.9 3.6   CHLORIDE mmol/L 105 105 100   CO2 mmol/L 18.1* 19.0* 20.3*   BUN mg/dL 7 8 6   CREATININE mg/dL 0.63* 0.60* 0.76   CALCIUM mg/dL 8.3* 8.0* 8.1*   BILIRUBIN mg/dL  0.3 0.3 0.3   ALK PHOS U/L 88 85 104   ALT (SGPT) U/L 196* 238* 238*   AST (SGOT) U/L 125* 243* 305*   GLUCOSE mg/dL 107* 91 96     Results from last 7 days   Lab Units 08/16/21  1340   INR  0.94     No results found for: LIPASE    Radiology:  US Gallbladder   Final Result         Electronically signed by Hossein Ferrera M.D. on 08-17-21 at 2153      CT Angiogram Chest   Final Result   1. There is no evidence for pulmonary thromboemboli.   2. Central and peripheral patchy and diffuse ground-glass opacities   within both lung fields likely represents atypical pneumonia. The mildly   enlarged hilar nodes are likely reactive. Reevaluation is recommended   with a contrast enhanced chest CT in 3 months.       Discussed with Dr. Short.       This report was finalized on 8/17/2021 7:09 AM by Dr. Natalia Smith M.D.          XR Chest 1 View   Final Result          Assessment/Plan     Patient Active Problem List   Diagnosis   • Pneumonia due to COVID-19 virus       Assessment:  1. Elevated transaminases, likely secondary to COVID-19 virus  2. Pneumonia secondary to COVID-19 virus    These problems are new to me.  Plan:  · Liver ultrasound was unrevealing.  · To need to monitor hepatic function panel.    I discussed the patients findings and my recommendations with patient.    ISAURO Shepherd APRN  [facility]  [address] [id number]  Office: [phone]

## 2021-08-19 NOTE — PROGRESS NOTES
"  Infectious Diseases Progress Note    Brennan Fajardo MD     [facility]  [place]: 3 days  Patient Identification:  Name: Jeronimo Bain  Age: 56 y.o.  Sex: male  :  1965  MRN: [id number]         Primary Care Physician: Edgar Sanchez MD            Subjective: feeling much beter  Interval History: See consultation note.    Objective:    Scheduled Meds:budesonide-formoterol, 2 puff, Inhalation, BID - RT  cefdinir, 300 mg, Oral, Q12H  cholecalciferol, 1,000 Units, Oral, Daily  [START ON 2021] dexamethasone, 6 mg, Oral, Daily With Breakfast  enoxaparin, 40 mg, Subcutaneous, Q24H  famotidine, 20 mg, Oral, BID  guaiFENesin, 600 mg, Oral, Q12H  remdesivir, 100 mg, Intravenous, Q24H      Continuous Infusions:Pharmacy Consult - Remdesivir,         Vital signs in last 24 hours:  Temp:  [95.9 °F (35.5 °C)-97.7 °F (36.5 °C)] 95.9 °F (35.5 °C)  Heart Rate:  [59-78] 70  Resp:  [16-19] 16  BP: (113-125)/(72-92) 113/74    Intake/Output:    Intake/Output Summary (Last 24 hours) at 2021 1802  Last data filed at 2021 1317  Gross per 24 hour   Intake 210 ml   Output --   Net 210 ml       Exam:  /74 (BP Location: Right arm, Patient Position: Sitting)   Pulse 70   Temp 95.9 °F (35.5 °C) (Oral)   Resp 16   Ht 175.3 cm (69\")   Wt 98.9 kg (218 lb)   SpO2 96%   BMI 32.19 kg/m²   Patient is examined using the personal protective equipment as per guidelines from infection control for this particular patient as enacted.  Hand washing was performed before and after patient interaction.  General Appearance:    Alert, cooperative, no distress, AAOx3  Patient examined from distance and only observation to his appearance respiratory status and overall outlook was made.       Data Review:    I reviewed the patient's new clinical results.  Results from last 7 days   Lab Units 21  0351 21  0321 21  0901 21  1340   WBC 10*3/mm3 3.75 4.03 5.03 4.38   HEMOGLOBIN g/dL 12.7* 11.9* 13.5 " 13.7   PLATELETS 10*3/mm3 273 226 189 178     Results from last 7 days   Lab Units 08/19/21  0351 08/18/21  0321 08/17/21  0901 08/16/21  1340   SODIUM mmol/L 135* 135* 130* 125*   POTASSIUM mmol/L 4.0 3.9 3.6 3.6   CHLORIDE mmol/L 105 105 100 92*   CO2 mmol/L 18.1* 19.0* 20.3* 22.8   BUN mg/dL 7 8 6 7   CREATININE mg/dL 0.63* 0.60* 0.76 0.87   CALCIUM mg/dL 8.3* 8.0* 8.1* 8.1*   GLUCOSE mg/dL 107* 91 96 97     Blood Culture   Date Value Ref Range Status   08/16/2021 No growth at 24 hours  Preliminary     No results found for: BCIDPCR, CXREFLEX, CSFCX, CULTURETIS  No results found for: CULTURES, HSVCX, URCX  No results found for: EYECULTURE, GCCX, HSVCULTURE, LABHSV  No results found for: LEGIONELLA, MRSACX, MUMPSCX, MYCOPLASCX  No results found for: NOCARDIACX, STOOLCX  No results found for: THROATCX, UNSTIMCULT, URINECX, CULTURE, VZVCULTUR  No results found for: VIRALCULTU, WOUNDCX  Microbiology Results (last 10 days)     Procedure Component Value - Date/Time    Respiratory Panel PCR w/COVID-19(SARS-CoV-2) [facility]/ERICA/SERGIO/PAD/COR/[facility]AD/[facility] In-House, NP Swab in UTM/VTM, 3-4 HR TAT - Swab, Nasopharynx [[id number]]  (Abnormal) Collected: 08/16/21 1537    Lab Status: Final result Specimen: Swab from Nasopharynx Updated: 08/16/21 2967     ADENOVIRUS, PCR Not Detected     Coronavirus 229E Not Detected     Coronavirus HKU1 Not Detected     Coronavirus NL63 Not Detected     Coronavirus OC43 Not Detected     COVID19 Detected     Human Metapneumovirus Not Detected     Human Rhinovirus/Enterovirus Not Detected     Influenza A PCR Not Detected     Influenza B PCR Not Detected     Parainfluenza Virus 1 Not Detected     Parainfluenza Virus 2 Not Detected     Parainfluenza Virus 3 Not Detected     Parainfluenza Virus 4 Not Detected     RSV, PCR Not Detected     Bordetella pertussis pcr Not Detected     Bordetella parapertussis PCR Not Detected     Chlamydophila pneumoniae PCR Not Detected     Mycoplasma pneumo by PCR Not Detected     Narrative:      In the setting of a positive respiratory panel with a viral infection PLUS a negative procalcitonin without other underlying concern for bacterial infection, consider observing off antibiotics or discontinuation of antibiotics and continue supportive care. If the respiratory panel is positive for atypical bacterial infection (Bordetella pertussis, Chlamydophila pneumoniae, or Mycoplasma pneumoniae), consider antibiotic de-escalation to target atypical bacterial infection.    Blood Culture - Blood, Arm, Left [[id number]] Collected: 08/16/21 1530    Lab Status: Preliminary result Specimen: Blood from Arm, Left Updated: 08/19/21 1545     Blood Culture No growth at 3 days    S. Pneumo Ag Urine or CSF - Urine, Urine, Clean Catch [[id number]]  (Normal) Collected: 08/16/21 1437    Lab Status: Final result Specimen: Urine, Clean Catch Updated: 08/16/21 2148     Strep Pneumo Ag Negative    Legionella Antigen, Urine - Urine, Urine, Clean Catch [[id number]]  (Normal) Collected: 08/16/21 1437    Lab Status: Final result Specimen: Urine, Clean Catch Updated: 08/16/21 2148     LEGIONELLA ANTIGEN, URINE Negative    Blood Culture - Blood, Arm, Left [[id number]] Collected: 08/16/21 1411    Lab Status: Preliminary result Specimen: Blood from Arm, Left Updated: 08/19/21 1430     Blood Culture No growth at 3 days    Blood Culture - Blood, Arm, Right [[id number]] Collected: 08/16/21 1343    Lab Status: Preliminary result Specimen: Blood from Arm, Right Updated: 08/19/21 1430     Blood Culture No growth at 3 days            Assessment:  1-systemic COVID-19 infection with pneumonia and possible secondary bacterial process given the duration of his symptoms.  2-hypoxia episodically especially with activity and exertion makes him at high risk of progression of disease.  3-Hyponatremia likely multifactorial including SIADH due to lung infection and associated dehydration due to decreased intake.  4-abnormal  LFTs        Recommendations/Discussions:    · Doing well and can be discharged on oral 3rd generation cephalosporin  Brennan Fajardo MD  8/19/2021  18:02 EDT    Much of this encounter note is an electronic transcription/translation of spoken language to printed text. The electronic translation of spoken language may permit erroneous, or at times, nonsensical words or phrases to be inadvertently transcribed; Although I have reviewed the note for such errors, some may still exist

## 2021-08-20 ENCOUNTER — READMISSION MANAGEMENT (OUTPATIENT)
Dept: CALL CENTER | Facility: HOSPITAL | Age: 56
End: 2021-08-20

## 2021-08-20 NOTE — OUTREACH NOTE
Prep Survey      Responses   [facility] facility patient discharged from?  [place]   Is LACE score < 7 ?  Yes   Emergency Room discharge w/ pulse ox?  No   Eligibility  Readm Mgmt   Discharge diagnosis  PN r/t Covid   Does the patient have one of the following disease processes/diagnoses(primary or secondary)?  COVID-19   Does the patient have Home health ordered?  No   Is there a DME ordered?  No   Prep survey completed?  Yes          Irma Savage RN

## 2021-08-20 NOTE — OUTREACH NOTE
COVID-19 Week 1 Survey      Responses   [facility] patient discharged from?  [place]   Does the patient have one of the following disease processes/diagnoses(primary or secondary)?  COVID-19   COVID-19 underlying condition?  None   Call Number  Call 1   Week 1 Call successful?  Yes   Call start time  1218   Call end time  1220   Meds reviewed with patient/caregiver?  Yes   Is the patient having any side effects they believe may be caused by any medication additions or changes?  No   Does the patient have all medications ordered at discharge?  Yes   Is the patient taking all medications as directed (includes completed medication regime)?  Yes   Does the patient have a primary care provider?   Yes   Comments regarding PCP  PATIENT STATES HE HAS TO HAVE A NEGATIVE COVID TEST BEFORE THEY WILL LET HIM IN HIS PCP OFFICE. PATIENT STATES HE WILL TAKE A TEST AFTER HIS QUARANTINE.    Does the patient have an appointment with their PCP or specialist within 7 days of discharge?  No   What is preventing the patient from scheduling follow up appointments within 7 days of discharge?  Unsure of when or with whom   Nursing Interventions  Advised patient to make appointment, Educated patient on importance of making appointment   Has the patient kept scheduled appointments due by today?  N/A   Has home health visited the patient within 72 hours of discharge?  N/A   Psychosocial issues?  No   Did the patient receive a copy of their discharge instructions?  Yes   Did the patient receive a copy of COVID-19 specific instructions?  No   Nursing interventions  Reviewed instructions with patient   What is the patient's perception of their health status since discharge?  Improving   Does the patient have any of the following symptoms?  Shortness of breath   Nursing Interventions  Nurse provided patient education   Pulse Ox monitoring  Intermittent   Pulse Ox device source  Patient   O2 Sat comments  PATIENT STATES HIS O2 SATS ARE  STAYING IN THE 90S   O2 Sat: education provided  Sat levels, Monitoring frequency, When to seek care   Is the patient/caregiver able to teach back steps to recovery at home?  Set small, achievable goals for return to baseline health, Rest and rebuild strength, gradually increase activity, Eat a well-balance diet, Make a list of questions for provider's appointment, Practice good oral hygiene   Is the patient/caregiver able to teach back the hierarchy of who to call/visit for symptoms/problems? PCP, Specialist, Home health nurse, Urgent Care, ED, 911  Yes   COVID-19 call completed?  Yes          Aracely Tompkins LPN

## 2021-08-20 NOTE — PROGRESS NOTES
Discharge Planning Assessment  [facility]     Patient Name: Jeronimo Bain  MRN: [id number]  Today's Date: 8/20/2021    Admit Date: 8/16/2021    Discharge Needs Assessment    No documentation.       Discharge Plan     Row Name 08/20/21 0812       Plan    Plan  HOme with home oxygen from [facility]    Final Discharge Disposition Code  01 - home or self-care    Final Note  HOme with [facility]        Continued Care and Services - Discharged on 8/19/2021 Admission date: 8/16/2021 - Discharge disposition: Home or Self Care    Durable Medical Equipment     Service Provider Request Status Selected Services Address Phone Fax Patient Preferred    [facility]   Selected Durable Medical Equipment [address] [id number] [phone] --              Expected Discharge Date and Time     Expected Discharge Date Expected Discharge Time    Aug 19, 2021         Demographic Summary    No documentation.       Functional Status    No documentation.       Psychosocial    No documentation.       Abuse/Neglect    No documentation.       Legal    No documentation.       Substance Abuse    No documentation.       Patient Forms    No documentation.           Irlanda Schulz RN

## 2021-08-20 NOTE — PAYOR COMM NOTE
"Jeronimo Calero (56 y.o. Male)     PLEASE SEE ATTACHED DC SUMMARY    REF#[id number]    THANK YOU    LANDY PAGE LPN CCP    Date of Birth Social Security Number Address Home Phone MRN    1965  [address] [address] [phone] [id number]    [employer] Marital Status          Unknown Single       Admission Date Admission Type Admitting Provider Attending Provider Department, Room/Bed    8/16/21 Emergency Jessee Alvarez MD  [address], [address]    Discharge Date Discharge Disposition Discharge Destination        8/19/2021 Home or Self Care              Attending Provider: (none)   Allergies: No Known Allergies    Isolation: None   Infection: COVID (confirmed) (08/16/21)   Code Status: Prior    Ht: 175.3 cm (69\")   Wt: 98.9 kg (218 lb)    Admission Cmt: None   Principal Problem: None                Active Insurance as of 8/16/2021     Primary Coverage     Payor Plan Insurance Group Employer/Plan Group    [employer] [facility] [id number]     Payor Plan Address Payor Plan Phone Number Payor Plan Fax Number Effective Dates    PO    8/1/2021 - None Entered    [facility] [id number]       Subscriber Name Subscriber Birth Date Member ID       JERONIMO CALERO 1965 [id number]                 Emergency Contacts      (Rel.) Home Phone Work Phone Mobile Phone    AMRGO HICKEY (Significant Other) [phone] -- [phone]               Discharge Summary      Jessee Alvarez MD at 08/19/21 1319        Discharge summary    Date of admission 8/16/2021  Date of discharge 8/19/2021    Final diagnosis  COVID-19 pneumonia with superadded bacterial pneumonia  Hyponatremia corrected  S/P dehydration  Elevated liver LFTs resolving  Ex-smoker  Gastroesophageal reflux disease    Discharge medications    Current Facility-Administered Medications:   •  acetaminophen (TYLENOL) tablet 650 mg, 650 mg, Oral, Q6H PRN, Jessee Alvarez MD, 650 mg at 08/16/21 9156  •  " albuterol (PROVENTIL) nebulizer solution 0.083% 2.5 mg/3mL, 2.5 mg, Nebulization, Q6H PRN, Jessee Alvarez MD  •  budesonide-formoterol (SYMBICORT) 160-4.5 MCG/ACT inhaler 2 puff, 2 puff, Inhalation, BID - RT, Jessee Alvarez MD, 2 puff at 08/19/21 0750  •  cefdinir (OMNICEF) capsule 300 mg, 300 mg, Oral, Q12H, Jessee Alvarez MD  •  cholecalciferol (VITAMIN D3) tablet 1,000 Units, 1,000 Units, Oral, Daily, Jessee Alvarez MD, 1,000 Units at 08/19/21 0906  •  [START ON 8/20/2021] dexamethasone (DECADRON) tablet 6 mg, 6 mg, Oral, Daily With Breakfast, Jessee Alvarez MD  •  enoxaparin (LOVENOX) syringe 40 mg, 40 mg, Subcutaneous, Q24H, Jessee Alvarez MD, 40 mg at 08/18/21 1646  •  famotidine (PEPCID) tablet 20 mg, 20 mg, Oral, BID, Jessee Alvarez MD, 20 mg at 08/19/21 1029  •  guaiFENesin (MUCINEX) 12 hr tablet 600 mg, 600 mg, Oral, Q12H, Jessee Alvarez MD, 600 mg at 08/19/21 0906  •  nitroglycerin (NITROSTAT) SL tablet 0.4 mg, 0.4 mg, Sublingual, Q5 Min PRN, Jessee Alvarez MD  •  Pharmacy Consult - Remdesivir, , Does not apply, Continuous PRN, Brennan Fajardo MD  •  [COMPLETED] remdesivir 200 mg in sodium chloride 0.9 % 290 mL IVPB (powder vial), 200 mg, Intravenous, Q24H, 200 mg at 08/17/21 0039 **FOLLOWED BY** remdesivir 100 mg in sodium chloride 0.9 % 270 mL IVPB (powder vial), 100 mg, Intravenous, Q24H, Brennan Fajardo MD, 100 mg at 08/18/21 2054  •  [COMPLETED] Insert peripheral IV, , , Once **AND** sodium chloride 0.9 % flush 10 mL, 10 mL, Intravenous, PRN, Efra Prieto MD     Consults obtained  Infectious disease  Gastroenterology    Procedures  None    Hospital course  56-year-old white male with no medical problem no home medication who was ex-smoker admitted to emergency room with shortness of breath nonproductive cough and generalized weakness.  Patient work-up in ER revealed COVID-19 pneumonia and also found to have superadded bacterial pneumonia admit for management.  Patient also have elevated liver enzymes and  severe dehydration with hyponatremia.  Patient admitted treated with supplemental oxygen nebulizer Decadron remdesivir and empiric antibiotics.  Patient responded to the treatment and feeling much better and his oxygen saturation 96% on 2 L.  Patient be discharged home after today's dose of remdesivir and remaining dose of Decadron and continue oral antibiotics for 5 more days.  Patient will continue quarantine for total of 14 days.  Patient also followed by gastroenterology and his liver enzymes are coming to normal.  Patient hepatitis profile remain negative.  Patient cleared for discharge.    Discharge diet regular    Activity as tolerated    Medication as above    Follow-up with primary doctor in 1 week and follow-up with infectious disease and gastroenterology per the instruction and take medication as directed    SUE MAS MD    Electronically signed by Sue Mas MD at 08/19/21 4022

## 2021-08-21 ENCOUNTER — READMISSION MANAGEMENT (OUTPATIENT)
Dept: CALL CENTER | Facility: HOSPITAL | Age: 56
End: 2021-08-21

## 2021-08-21 LAB
BACTERIA SPEC AEROBE CULT: NORMAL

## 2021-08-21 NOTE — OUTREACH NOTE
COVID-19 Week 1 Survey      Responses   [facility] [place] patient discharged from?  [place]   Does the patient have one of the following disease processes/diagnoses(primary or secondary)?  COVID-19   COVID-19 underlying condition?  None   Call Number  Call 2   Week 1 Call successful?  Yes   Call start time  1130   Call end time  1140   Discharge diagnosis  PN r/t Covid   Is patient permission given to speak with other caregiver?  No   Meds reviewed with patient/caregiver?  Yes   Does the patient have all medications ordered at discharge?  Yes   Is the patient taking all medications as directed (includes completed medication regime)?  Yes   Does the patient have a primary care provider?   Yes   Comments regarding PCP  Follow up with Edgar Sanchez MD   Does the patient have an appointment with their PCP or specialist within 7 days of discharge?  No   Nursing Interventions  Educated patient on importance of making appointment, Advised patient to make appointment   Has the patient kept scheduled appointments due by today?  N/A   Has home health visited the patient within 72 hours of discharge?  N/A   What DME was ordered?  [facility], home O2   Has all DME been delivered?  Yes   DME comments  Home O22L.    Psychosocial issues?  No   Did the patient receive a copy of their discharge instructions?  Yes   Did the patient receive a copy of COVID-19 specific instructions?  Yes   Nursing interventions  Reviewed instructions with patient   What is the patient's perception of their health status since discharge?  Improving   Does the patient have any of the following symptoms?  Cough, Loss of taste/smell [slight dry cough, taste is improving/ not 100%. ]   Nursing Interventions  Nurse provided patient education   Pulse Ox monitoring  Intermittent   Pulse Ox device source  Patient   O2 Sat comments  Patient reports 96% with wearing O22L.    O2 Sat: education provided  Sat levels, Monitoring frequency, When to seek care   Is  the patient/caregiver able to teach back steps to recovery at home?  Set small, achievable goals for return to baseline health, Rest and rebuild strength, gradually increase activity, Eat a well-balance diet   If the patient is a current smoker, are they able to teach back resources for cessation?  Not a smoker   Is the patient/caregiver able to teach back the hierarchy of who to call/visit for symptoms/problems? PCP, Specialist, Home health nurse, Urgent Care, ED, 911  Yes   COVID-19 call completed?  Yes   Wrap up additional comments  Patient following 14day quarantine. Patient denies any new questions or concerns today.           Lary Munguia RN

## 2021-08-22 ENCOUNTER — READMISSION MANAGEMENT (OUTPATIENT)
Dept: CALL CENTER | Facility: HOSPITAL | Age: 56
End: 2021-08-22

## 2021-08-22 NOTE — OUTREACH NOTE
COVID-19 Week 1 Survey      Responses   [facility] patient discharged from?  [place]   Does the patient have one of the following disease processes/diagnoses(primary or secondary)?  COVID-19   COVID-19 underlying condition?  None   Call Number  Call 3   Week 1 Call successful?  Yes   Call start time  1515   Call end time  1518   Discharge diagnosis  PN r/t Covid   Meds reviewed with patient/caregiver?  Yes   Is the patient having any side effects they believe may be caused by any medication additions or changes?  No   Does the patient have all medications ordered at discharge?  Yes   Is the patient taking all medications as directed (includes completed medication regime)?  Yes   Does the patient have a primary care provider?   Yes   Does the patient have an appointment with their PCP or specialist within 7 days of discharge?  No   What is preventing the patient from scheduling follow up appointments within 7 days of discharge?  Haven't had time, Uncomfortable attending in person appointment [waiting until after quarantine]   Nursing Interventions  Advised patient to make appointment   Has the patient kept scheduled appointments due by today?  N/A   Has home health visited the patient within 72 hours of discharge?  N/A   Psychosocial issues?  No   Did the patient receive a copy of their discharge instructions?  Yes   Did the patient receive a copy of COVID-19 specific instructions?  Yes   Nursing interventions  Reviewed instructions with patient   What is the patient's perception of their health status since discharge?  Improving   Does the patient have any of the following symptoms?  Cough   Nursing Interventions  Nurse provided patient education   Pulse Ox monitoring  Intermittent   Pulse Ox device source  Patient   O2 Sat comments  95-97% on 2L O2   O2 Sat: education provided  Sat levels, Monitoring frequency, When to seek care   Is the patient/caregiver able to teach back steps to recovery at home?  Set  small, achievable goals for return to baseline health, Rest and rebuild strength, gradually increase activity, Eat a well-balance diet   Is the patient/caregiver able to teach back the hierarchy of who to call/visit for symptoms/problems? PCP, Specialist, Home health nurse, Urgent Care, ED, 911  Yes   COVID-19 call completed?  Yes          Zuly Garrett RN

## 2021-08-26 ENCOUNTER — READMISSION MANAGEMENT (OUTPATIENT)
Dept: CALL CENTER | Facility: HOSPITAL | Age: 56
End: 2021-08-26

## 2021-08-26 NOTE — OUTREACH NOTE
COVID-19 Week 2 Survey      Responses   [facility] patient discharged from?  [place]   Does the patient have one of the following disease processes/diagnoses(primary or secondary)?  COVID-19   COVID-19 underlying condition?  None   Call Number  Call 1   COVID-19 Week 2: Call 1 attempt successful?  Yes   Call start time  1211   Call end time  1218   Discharge diagnosis  PN r/t Covid   Person spoke with today (if not patient) and relationship  patient   Meds reviewed with patient/caregiver?  Yes   Is the patient having any side effects they believe may be caused by any medication additions or changes?  No   Does the patient have all medications ordered at discharge?  Yes   Is the patient taking all medications as directed (includes completed medication regime)?  Yes   Does the patient have a primary care provider?   Yes   Does the patient have an appointment with their PCP or specialist within 7 days of discharge?  No   Nursing Interventions  Educated patient on importance of making appointment, Advised patient to make appointment   Has the patient kept scheduled appointments due by today?  N/A   DME comments  Pt has not used O2 in a few days   Psychosocial issues?  No   What is the patient's perception of their health status since discharge?  Improving   Does the patient have any of the following symptoms?  Shortness of breath   Nursing Interventions  Nurse provided patient education   Pulse Ox monitoring  Intermittent   Pulse Ox device source  Patient   O2 Sat comments  94-95% RA (has not used O2 last few days)   O2 Sat: education provided  Sat levels   O2 Sat education comments  sats remaining below 90% call 911/go to ED   Is the patient/caregiver able to teach back steps to recovery at home?  Rest and rebuild strength, gradually increase activity, Set small, achievable goals for return to baseline health, Eat a well-balance diet   COVID-19 call completed?  Yes   Wrap up additional comments  Patient states  he is getting better,  pt reports SOB on exertion. No questions/concerns.          June Hays RN

## 2021-08-27 ENCOUNTER — IMMUNIZATION (OUTPATIENT)
Dept: VACCINE CLINIC | Facility: HOSPITAL | Age: 56
End: 2021-08-27

## 2021-08-27 PROCEDURE — 0002A: CPT | Performed by: INTERNAL MEDICINE

## 2021-08-27 PROCEDURE — 91300 HC SARSCOV02 VAC 30MCG/0.3ML IM: CPT | Performed by: INTERNAL MEDICINE
